# Patient Record
Sex: FEMALE | Race: WHITE | NOT HISPANIC OR LATINO | Employment: OTHER | URBAN - METROPOLITAN AREA
[De-identification: names, ages, dates, MRNs, and addresses within clinical notes are randomized per-mention and may not be internally consistent; named-entity substitution may affect disease eponyms.]

---

## 2017-06-01 ENCOUNTER — DOCUMENTATION ONLY (OUTPATIENT)
Dept: LAB | Facility: CLINIC | Age: 82
End: 2017-06-01

## 2017-06-01 DIAGNOSIS — M81.0 OSTEOPOROSIS: ICD-10-CM

## 2017-06-01 DIAGNOSIS — N18.30 CKD (CHRONIC KIDNEY DISEASE) STAGE 3, GFR 30-59 ML/MIN (H): Primary | ICD-10-CM

## 2017-06-01 DIAGNOSIS — I10 HYPERTENSION GOAL BP (BLOOD PRESSURE) < 140/90: ICD-10-CM

## 2017-07-28 DIAGNOSIS — I10 HYPERTENSION GOAL BP (BLOOD PRESSURE) < 140/90: ICD-10-CM

## 2017-07-28 DIAGNOSIS — M81.0 OSTEOPOROSIS: ICD-10-CM

## 2017-07-28 DIAGNOSIS — N18.30 CKD (CHRONIC KIDNEY DISEASE) STAGE 3, GFR 30-59 ML/MIN (H): ICD-10-CM

## 2017-07-28 LAB
ANION GAP SERPL CALCULATED.3IONS-SCNC: 8 MMOL/L (ref 3–14)
BUN SERPL-MCNC: 18 MG/DL (ref 7–30)
CALCIUM SERPL-MCNC: 9.5 MG/DL (ref 8.5–10.1)
CHLORIDE SERPL-SCNC: 106 MMOL/L (ref 94–109)
CO2 SERPL-SCNC: 30 MMOL/L (ref 20–32)
CREAT SERPL-MCNC: 1.04 MG/DL (ref 0.52–1.04)
DEPRECATED CALCIDIOL+CALCIFEROL SERPL-MC: 61 UG/L (ref 20–75)
GFR SERPL CREATININE-BSD FRML MDRD: 51 ML/MIN/1.7M2
GLUCOSE SERPL-MCNC: 86 MG/DL (ref 70–99)
HGB BLD-MCNC: 13.8 G/DL (ref 11.7–15.7)
POTASSIUM SERPL-SCNC: 4.4 MMOL/L (ref 3.4–5.3)
SODIUM SERPL-SCNC: 144 MMOL/L (ref 133–144)

## 2017-07-28 PROCEDURE — 85018 HEMOGLOBIN: CPT | Performed by: INTERNAL MEDICINE

## 2017-07-28 PROCEDURE — 82306 VITAMIN D 25 HYDROXY: CPT | Performed by: INTERNAL MEDICINE

## 2017-07-28 PROCEDURE — 80048 BASIC METABOLIC PNL TOTAL CA: CPT | Performed by: INTERNAL MEDICINE

## 2017-07-28 PROCEDURE — 36415 COLL VENOUS BLD VENIPUNCTURE: CPT | Performed by: INTERNAL MEDICINE

## 2017-08-03 DIAGNOSIS — I10 ESSENTIAL HYPERTENSION WITH GOAL BLOOD PRESSURE LESS THAN 140/90: ICD-10-CM

## 2017-08-03 RX ORDER — AMLODIPINE BESYLATE 5 MG/1
TABLET ORAL
Qty: 30 TABLET | Refills: 0 | Status: SHIPPED | OUTPATIENT
Start: 2017-08-03 | End: 2017-08-04

## 2017-08-03 NOTE — TELEPHONE ENCOUNTER
Prescription approved per [facility] Refill Protocol.  Patient due for office visit for further refills.  Tawnya Pantoja RN - BC

## 2017-08-03 NOTE — TELEPHONE ENCOUNTER
amLODIPine (NORVASC) 5 MG tablet      Last Written Prescription Date: 7/29/2016  Last Fill Quantity: 90, # refills: 4    Last Office Visit with [facility], [facility] or [facility] prescribing provider:  7/29/2016   Future Office Visit:    Next 5 appointments (look out 90 days)     Aug 04, 2017  9:00 AM CDT   PHYSICAL with Comfort Osborne MD   [facility] ([facility]    [address] [facility] [facility] [address]   [phone]                    BP Readings from Last 3 Encounters:   07/29/16 122/74   11/13/15 138/88   06/05/15 148/80

## 2017-08-04 ENCOUNTER — OFFICE VISIT (OUTPATIENT)
Dept: INTERNAL MEDICINE | Facility: CLINIC | Age: 82
End: 2017-08-04
Payer: COMMERCIAL

## 2017-08-04 VITALS
TEMPERATURE: 98.1 F | OXYGEN SATURATION: 95 % | BODY MASS INDEX: 28.31 KG/M2 | DIASTOLIC BLOOD PRESSURE: 80 MMHG | HEIGHT: 60 IN | SYSTOLIC BLOOD PRESSURE: 150 MMHG | WEIGHT: 144.2 LBS | HEART RATE: 80 BPM

## 2017-08-04 DIAGNOSIS — N18.30 CKD (CHRONIC KIDNEY DISEASE) STAGE 3, GFR 30-59 ML/MIN (H): ICD-10-CM

## 2017-08-04 DIAGNOSIS — Z00.00 ROUTINE GENERAL MEDICAL EXAMINATION AT A HEALTH CARE FACILITY: Primary | ICD-10-CM

## 2017-08-04 DIAGNOSIS — I10 HYPERTENSION GOAL BP (BLOOD PRESSURE) < 140/90: ICD-10-CM

## 2017-08-04 DIAGNOSIS — M81.0 AGE-RELATED OSTEOPOROSIS WITHOUT CURRENT PATHOLOGICAL FRACTURE: ICD-10-CM

## 2017-08-04 PROCEDURE — 99397 PER PM REEVAL EST PAT 65+ YR: CPT | Performed by: INTERNAL MEDICINE

## 2017-08-04 RX ORDER — AMLODIPINE BESYLATE 5 MG/1
TABLET ORAL
Qty: 90 TABLET | Refills: 3 | Status: SHIPPED | OUTPATIENT
Start: 2017-08-04 | End: 2018-07-24

## 2017-08-04 ASSESSMENT — PAIN SCALES - GENERAL: PAINLEVEL: NO PAIN (0)

## 2017-08-04 NOTE — NURSING NOTE
Chief Complaint   Patient presents with     Physical     Discuss family history of Afib, discuss leg cramping at night.       Initial /84 (BP Location: Right arm, Cuff Size: Adult Regular)  Pulse 80  Temp 98.1  F (36.7  C) (Oral)  Ht 5' (1.524 m)  Wt 144 lb 3.2 oz (65.4 kg)  SpO2 95%  Breastfeeding? No  BMI 28.16 kg/m2 Estimated body mass index is 28.16 kg/(m^2) as calculated from the following:    Height as of this encounter: 5' (1.524 m).    Weight as of this encounter: 144 lb 3.2 oz (65.4 kg).  Medication Reconciliation: complete       Jennifer Dye, CMA

## 2017-08-04 NOTE — PROGRESS NOTES
INTERNAL MEDICINE   SUBJECTIVE:   Priscilla Shaw is a 82 year old female who presents for Preventive Visit.      Are you in the first 12 months of your Medicare coverage?  No    Physical   Annual:     Getting at least 3 servings of Calcium per day::  Yes    Bi-annual eye exam::  Yes    Dental care twice a year::  Yes    Sleep apnea or symptoms of sleep apnea::  None    Diet::  Regular (no restrictions)    Taking medications regularly::  Yes    Medication side effects::  None    Additional concerns today::  YES      COGNITIVE SCREEN  1) Repeat 3 items (Banana, Sunrise, Chair)  2) Clock draw: NORMAL  3) 3 item recall: Recalls 3 objects  Results: 3 items recalled: COGNITIVE IMPAIRMENT LESS LIKELY    Mini-CogTM Copyright S David. Licensed by the author for use in [facility]; reprinted with permission (jasbir@[facility]). All rights reserved.          Reviewed and updated as needed this visit by clinical staffTobacco  Allergies  Meds  Med Hx  Surg Hx  Fam Hx  Soc Hx        Reviewed and updated as needed this visit by Provider        Social History   Substance Use Topics     Smoking status: Former Smoker     Types: Cigarettes     Quit date: 9/16/2009     Smokeless tobacco: Never Used     Alcohol use Yes            Standardized Alcohol Screening Questionnaire  AUDIT   Questions 0 1 2 3 4 Score   1. How often do you have a drink  containing alcohol? Never Monthly or less 2 to 4  times a  month 2 to 3  times a  week 4 or more  times a  week  4   2. How many drinks containing alcohol  do you have on a typical day when you are drinking? 1 or 2 3 or 4 5 or 6 7 to 9 10 or more  0   3. How often do you have more than five  or more drinks on one occasion? Never Less  than  monthly Monthly Weekly Daily or  almost  daily  0   4. How often during the last year have  you found that you were not able to stop drinking once you had started? Never Less  than  monthly Monthly Weekly Daily or  almost  daily  0   5. How often  during the last year have  you failed to do what was normally expected of you because of drinking? Never Less  than  monthly Monthly Weekly Daily or  almost  daily  0   6. How often during the last year have  you needed a first drink in the morning to get yourself going after a heavy drinking session? Never Less  than  monthly Monthly Weekly Daily or  almost  daily  0   7. How often during the last year have you had a feeling of guilt or remorse after drinking? Never Less  than  monthly Monthly Weekly Daily or  almost  daily  0   8. How often during the last year have  you been unable to remember what happened the night before because of your drinking? Never Less  than  monthly Monthly Weekly Daily or  almost  daily  0   9. Have you or someone else been  injured because of your drinking? No  Yes, but not in the last year  Yes,  during the  last year  0   10. Has a relative, friend, doctor or other health care worker been concerned about your drinking or suggested you cut down? No  Yes, but not in the last year  Yes,  during the  last year  0   Total  4   Scoring: A score of 7 for adult men is an indication of hazardous drinking (risk for physical or physiological harm); a score of 8 or more is an indication of an alcohol use disorder. A score of 5 or more for adult women  is an indication of hazardous drinking or an alcohol use disorder.               Chief Complaint   Patient presents with     Physical     Discuss family history of Afib, discuss leg cramping at night.         Today's PHQ-2 Score: PHQ-2 ( 1999 Pf[employer]) 8/4/2017   Q1: Little interest or pleasure in doing things 0   Q2: Feeling down, depressed or hopeless 0   PHQ-2 Score 0   Q1: Little interest or pleasure in doing things Not at all   Q2: Feeling down, depressed or hopeless Not at all   PHQ-2 Score 0       Do you feel safe in your environment - Yes    Do you have a Health Care Directive?: Yes: Patient states has Advance Directive and will bring in a  copy to clinic.    Current providers sharing in care for this patient include:   Patient Care Team:  Comfort Osborne MD as PCP - General      Hearing impairment: No    Ability to successfully perform activities of daily living: Yes, no assistance needed     Fall risk:         Home safety:  none identified      The following health maintenance items are reviewed in Epic and correct as of today:Health Maintenance   Topic Date Due     EYE EXAM Q1 YEAR  07/29/2017     FALL RISK ASSESSMENT  07/29/2017     INFLUENZA VACCINE (SYSTEM ASSIGNED)  09/01/2017     DEXA Q2 YR  09/18/2017     BMP Q1 YR  07/28/2018     MAMMO Q2 YR  09/09/2018     LIPID MONITORING Q5 YEARS  06/05/2020     ADVANCE DIRECTIVE PLANNING Q5 YRS  11/11/2021     TETANUS IMMUNIZATION (SYSTEM ASSIGNED)  05/24/2023     COLONOSCOPY Q10 YR  09/23/2024     PNEUMOCOCCAL  Completed     Labs reviewed in EPIC      A Fib - Her older and younger siblings all have a.fib which has her concerned for her risk of getting it as well. Two of her older siblings have pacemakers, her younger brother is on Warfarin, and her younger sister recently had an ablation. She has not been taking her blood pressure at home.     Lab Review - Mild kidney function impairment that has been stable for three years. Normal electrolytes and cholesterol. Glucose was 86. Vitamin D level normal. Normal hemoglobin.     Leg Cramping - Only happens at night about once weekly. This always happens in the middle of the night. She relates that it was recommended that she try an over the counter medication, but does not recall what it was. Her cramps are localized in her thighs. She used to get cramping in her knee when she was walking around in [place].     Alcohol Use - She will have two small glasses of wine on the occasions that she does drink.     ROS:  C: NEGATIVE for fever, chills, change in weight  E/M: POSITIVE for dental implants. NEGATIVE for ear, mouth and throat problems  B: NEGATIVE for  masses, tenderness or discharge  CV: NEGATIVE for chest pain, palpitations or peripheral edema. NO carotid bruits.   M: POSITIVE for nocturnal leg cramping. NEGATIVE for significant arthralgias or myalgia  H: NEGATIVE for bleeding problems  All other systems reviewed and were negative.      This document serves as a record of the services and decisions personally performed and made by Comfort Osborne MD. It was created on his/her behalf by Libra Sams, a trained medical scribe. The creation of this document is based the provider's statements to the medical scribe.    Scribsherman Sams 9:13 AM, August 4, 2017    OBJECTIVE:   /84 (BP Location: Right arm, Cuff Size: Adult Regular)  Pulse 80  Temp 98.1  F (36.7  C) (Oral)  Ht 5' (1.524 m)  Wt 144 lb 3.2 oz (65.4 kg)  SpO2 95%  Breastfeeding? No  BMI 28.16 kg/m2 Estimated body mass index is 28.16 kg/(m^2) as calculated from the following:    Height as of this encounter: 5' (1.524 m).    Weight as of this encounter: 144 lb 3.2 oz (65.4 kg).  EXAM:   GENERAL APPEARANCE: healthy, alert and no distress  EYES: Eyes grossly normal to inspection, PERRL and conjunctivae and sclerae normal  HENT: ear canals and TM's normal, nose and mouth without ulcers or lesions, oropharynx clear and oral mucous membranes moist  NECK: no adenopathy, no asymmetry, masses, or scars and thyroid normal to palpation  RESP: lungs clear to auscultation - no rales, rhonchi or wheezes  BREAST: normal without masses, tenderness or nipple discharge and no palpable axillary masses or adenopathy  CV: regular rate and rhythm, normal S1 S2, no S3 or S4, no murmur, click or rub, no peripheral edema.   ABDOMEN: soft, nontender, no hepatosplenomegaly, no masses and bowel sounds normal  MS: no musculoskeletal defects are noted and gait is age appropriate without ataxia. 1+ posterior tibial pulses.   SKIN: no suspicious lesions or rashes. Cherry hemangiomas and seborrheic keratoses on the  back.   NEURO: Normal strength and tone, sensory exam grossly normal, mentation intact and speech normal  PSYCH: mentation appears normal and affect normal/bright    ASSESSMENT / PLAN:   I spent 20 minutes of time with the patient and >50% of it was in education and counseling regarding preventive healthcare.     1. Essential hypertension with goal blood pressure less than 140/90   a little high - pharmacy blood pressure check in 2 weeks.  - amLODIPine (NORVASC) 5 MG tablet;   Dispense: 30 tablet; Refill: 0    2. Routine general medical examination at a health care facility    ckd- The current medical regimen is effective;  continue present plan and medications.     Osteoporosis - DEXa due     End of Life Planning:  Patient currently has an advanced directive: Yes.  Practitioner is supportive of decision.      COUNSELING:  Reviewed preventive health counseling, as reflected in patient instructions       Regular exercise  Estimated body mass index is 28.16 kg/(m^2) as calculated from the following:    Height as of this encounter: 5' (1.524 m).    Weight as of this encounter: 144 lb 3.2 oz (65.4 kg).   reports that she quit smoking about 7 years ago. Her smoking use included Cigarettes. She has never used smokeless tobacco.      Appropriate preventive services were discussed with this patient, including applicable screening as appropriate for cardiovascular disease, diabetes, osteopenia/osteoporosis, and glaucoma.  As appropriate for age/gender, discussed screening for colorectal cancer, prostate cancer, breast cancer, and cervical cancer. Checklist reviewing preventive services available has been given to the patient.    Reviewed patients plan of care and provided an AVS. The Basic Care Plan (routine screening as documented in Health Maintenance) for Priscilla meets the Care Plan requirement. This Care Plan has been established and reviewed with the Patient.      Patient Instructions   - Do not take Ibuprofen, Advil, or  Aleve for pain relief because of kidney function. You can take Tylenol instead as needed.   - Try to stretch your legs before going to bed to avoid cramping.   - You can take a calcium-magnesium tablet at bedtime to help with the cramping too.   - Stick to one glass of wine when you drink.   - Continue to walk as much as you can, this will help with the cramping in your legs at night.   - Have your bone density exam this Fall.  - Follow up in 1 year or sooner as needed.        Counseling Resources:  ATP IV Guidelines  Pooled Cohorts Equation Calculator  Breast Cancer Risk Calculator  FRAX Risk Assessment  ICSI Preventive Guidelines  Dietary Guidelines for Americans, 2010  USDA's MyPlate  ASA Prophylaxis  Lung CA Screening  The information in this document, created by the medical scribe for me, accurately reflects the services I personally performed and the decisions made by me. I have reviewed and approved this document for accuracy prior to leaving the patient care area.  Comfort Osborne MD  9:12 AM, 08/04/17    Comfort Osborne MD  [facility] for HPI/ROS submitted by the patient on 8/4/2017   PHQ-2 Score: 0    Start: 9:12 AM   End: 9:32 AM

## 2017-08-04 NOTE — MR AVS SNAPSHOT
After Visit Summary   8/4/2017    Priscilla Shaw    MRN: [id number]           Patient Information     Date Of Birth          1935        Visit Information        Provider Department      8/4/2017 9:00 AM Comfort Osborne MD [facility]        Today's Diagnoses     Routine general medical examination at a health care facility    -  1    Essential hypertension with goal blood pressure less than 140/90        Age-related osteoporosis without current pathological fracture        Hypertension goal BP (blood pressure) < 140/90          Care Instructions    - Do not take Ibuprofen, Advil, or Aleve for pain relief because of kidney function. You can take Tylenol instead as needed.   - Try to stretch your legs before going to bed to avoid cramping.   - You can take a calcium-magnesium tablet at bedtime to help with the cramping too.   - Stick to one glass of wine when you drink.   - Continue to walk as much as you can, this will help with the cramping in your legs at night.   - Have your bone density exam this Fall.  - Follow up in 1 year or sooner as needed.         [facility]    If you have any questions regarding to your visit please contact your care team:     Team Pink:   Clinic Hours Telephone Number   Internal Medicine:  Dr. Comfort Post NP       7am-7pm  Monday - Thursday   7am-5pm  Fridays  [phone]  (Appointment scheduling available 24/7)    Questions about your visit?  Team Line  [phone]   Urgent Care - [place] - 11am-9pm Monday-Friday Saturday-Sunday- 9am-5pm   [place] - 5pm-9pm Monday-Friday Saturday-Sunday- 9am-5pm  [phone] - [place]   [phone] - [place]       What options do I have for visits at the clinic other than the traditional office visit?  To expand how we care for you, many of our providers are utilizing electronic visits (e-visits) and telephone visits, when  medically appropriate, for interactions with their patients rather than a visit in the clinic.   We also offer nurse visits for many medical concerns. Just like any other service, we will bill your insurance company for this type of visit based on time spent on the phone with your provider. Not all insurance companies cover these visits. Please check with your medical insurance if this type of visit is covered. You will be responsible for any charges that are not paid by your insurance.      E-visits via Decisyonhart:  generally incur a $35.00 fee.  Telephone visits:  Time spent on the phone: *charged based on time that is spent on the phone in increments of 10 minutes. Estimated cost:   5-10 mins $30.00   11-20 mins. $59.00   21-30 mins. $85.00   Use [employer] [place] (secure email communication and access to your chart) to send your primary care provider a message or make an appointment. Ask someone on your Team how to sign up for [employer] [place].    For a Price Quote for your services, please call our [employer] Line at [phone].    As always, Thank you for trusting us with your health care needs!    Discharged by Wilma PICKARD CMA ([facility])            Follow-ups after your visit        Follow-up notes from your care team     Return in about 1 year (around 8/4/2018).      Future tests that were ordered for you today     Open Future Orders        Priority Expected Expires Ordered    DX Hip/Pelvis/Spine Routine  8/4/2018 8/4/2017            Who to contact     If you have questions or need follow up information about today's clinic visit or your schedule please contact [facility] directly at [phone].  Normal or non-critical lab and imaging results will be communicated to you by MyChart, letter or phone within 4 business days after the clinic has received the results. If you do not hear from us within 7 days, please contact the clinic through MyChart or phone. If you have a critical or abnormal lab result, we will  "notify you by phone as soon as possible.  Submit refill requests through [employer] or call your pharmacy and they will forward the refill request to us. Please allow 3 business days for your refill to be completed.          Additional Information About Your Visit        miloghart Information     [employer] lets you send messages to your doctor, view your test results, renew your prescriptions, schedule appointments and more. To sign up, go to [email or web address] . Click on \"Log in\" on the left side of the screen, which will take you to the Welcome page. Then click on \"Sign up Now\" on the right side of the page.     You will be asked to enter the access code listed below, as well as some personal information. Please follow the directions to create your username and password.     Your access code is: [email or web address]  Expires: 2017  9:32 AM     Your access code will  in 90 days. If you need help or a new code, please call your [place] clinic or [phone].        Care EveryWhere ID     This is your Care EveryWhere ID. This could be used by other organizations to access your [place] medical records  [id number]        Your Vitals Were     Pulse Temperature Height Pulse Oximetry Breastfeeding? BMI (Body Mass Index)    80 98.1  F (36.7  C) (Oral) 5' (1.524 m) 95% No 28.16 kg/m2       Blood Pressure from Last 3 Encounters:   17 144/84   16 122/74   11/13/15 138/88    Weight from Last 3 Encounters:   17 144 lb 3.2 oz (65.4 kg)   16 140 lb (63.5 kg)   11/13/15 143 lb (64.9 kg)                 Today's Medication Changes          These changes are accurate as of: 17  9:32 AM.  If you have any questions, ask your nurse or doctor.               These medicines have changed or have updated prescriptions.        Dose/Directions    amLODIPine 5 MG tablet   Commonly known as:  NORVASC   This may have changed:  See the new instructions.   Used for:  Essential hypertension with goal blood pressure " less than 140/90   Changed by:  Comfort Osborne MD        TAKE 1 TABLET (5 MG) BY MOUTH DAILY   Quantity:  90 tablet   Refills:  3            Where to get your medicines      These medications were sent to [place] [id number] [facility] - [place] - [address]  [address] [place] [address]     Phone:  [phone]     amLODIPine 5 MG tablet                Primary Care Provider Office Phone # Fax #    Comfort Osborne [phone]       [address] [address]        Equal Access to Services     [facility]: Hadii aad ku hadasho Soomaali, waaxda luqadaha, qaybta kaalmada adeegyada, waxay idiin hayaan ademandie estradaaraerasto calle . So [facility] [phone].    ATENCIÓN: Si habla español, tiene a garcía disposición servicios gratuitos de asistencia lingüística. [facility] [phone].    We comply with applicable federal civil rights laws and [place] laws. We do not discriminate on the basis of race, color, national origin, age, disability sex, sexual orientation or gender identity.            Thank you!     Thank you for choosing [facility]  for your care. Our goal is always to provide you with excellent care. Hearing back from our patients is one way we can continue to improve our services. Please take a few minutes to complete the written survey that you may receive in the mail after your visit with us. Thank you!             Your Updated Medication List - Protect others around you: Learn how to safely use, store and throw away your medicines at www.disposemymeds.org.          This list is accurate as of: 8/4/17  9:32 AM.  Always use your most recent med list.                   Brand Name Dispense Instructions for use Diagnosis    amLODIPine 5 MG tablet    NORVASC    90 tablet    TAKE 1 TABLET (5 MG) BY MOUTH DAILY    Essential hypertension with goal blood pressure less than 140/90       aspirin 81 MG tablet      Take 1 tablet by  mouth daily.        CENTRUM SILVER per tablet      Take 1 tablet by mouth daily. 1 tablet        cod liver oil Caps capsule      1 daily        SUPER B COMPLEX Tabs      1 daily        VIACTIV 500-500-40 MG-UNT-MCG Chew   Generic drug:  calcium-vitamin D-vitamin K      1 daily        vitamin D 400 UNITS tablet      1daily

## 2017-08-04 NOTE — PATIENT INSTRUCTIONS
- Do not take Ibuprofen, Advil, or Aleve for pain relief because of kidney function. You can take Tylenol instead as needed.   - Try to stretch your legs before going to bed to avoid cramping.   - You can take a calcium-magnesium tablet at bedtime to help with the cramping too.   - Stick to one glass of wine when you drink.   - Continue to walk as much as you can, this will help with the cramping in your legs at night.   - Have your bone density exam this Fall.  - Follow up in 1 year or sooner as needed.         [facility]    If you have any questions regarding to your visit please contact your care team:     Team Pink:   Clinic Hours Telephone Number   Internal Medicine:  Dr. Comfort Post NP       7am-7pm  Monday - Thursday   7am-5pm  Fridays  [phone]  (Appointment scheduling available 24/7)    Questions about your visit?  Team Line  [phone]   Urgent Care - [place] - 11am-9pm Monday-Friday Saturday-Sunday- 9am-5pm   [place] - 5pm-9pm Monday-Friday Saturday-Sunday- 9am-5pm  [phone] - [place]   [phone] - [place]       What options do I have for visits at the clinic other than the traditional office visit?  To expand how we care for you, many of our providers are utilizing electronic visits (e-visits) and telephone visits, when medically appropriate, for interactions with their patients rather than a visit in the clinic.   We also offer nurse visits for many medical concerns. Just like any other service, we will bill your insurance company for this type of visit based on time spent on the phone with your provider. Not all insurance companies cover these visits. Please check with your medical insurance if this type of visit is covered. You will be responsible for any charges that are not paid by your insurance.      E-visits via [employer] Software:  generally incur a $35.00 fee.  Telephone visits:  Time spent on the  phone: *charged based on time that is spent on the phone in increments of 10 minutes. Estimated cost:   5-10 mins $30.00   11-20 mins. $59.00   21-30 mins. $85.00   Use Yoozonhart (secure email communication and access to your chart) to send your primary care provider a message or make an appointment. Ask someone on your Team how to sign up for [employer].    For a Price Quote for your services, please call our [employer] Price Line at [phone].    As always, Thank you for trusting us with your health care needs!    Discharged by Wilma PICKARD CMA ([facility])

## 2017-08-18 ENCOUNTER — ALLIED HEALTH/NURSE VISIT (OUTPATIENT)
Dept: FAMILY MEDICINE | Facility: CLINIC | Age: 82
End: 2017-08-18

## 2017-08-18 VITALS — SYSTOLIC BLOOD PRESSURE: 138 MMHG | DIASTOLIC BLOOD PRESSURE: 82 MMHG

## 2017-08-18 DIAGNOSIS — I10 HYPERTENSION GOAL BP (BLOOD PRESSURE) < 140/90: Primary | ICD-10-CM

## 2017-08-18 NOTE — PROGRESS NOTES
Priscilla Shaw is enrolled/participating in the retail pharmacy Blood Pressure Goals Achievement Program (BPGAP).  Priscilla Shaw was evaluated at [facility] on August 18, 2017 at which time her blood pressure was:    BP Readings from Last 3 Encounters:   08/18/17 138/82   08/04/17 150/80   07/29/16 122/74     Reviewed lifestyle modifications for blood pressure control and reduction: including making healthy food choices, managing weight, getting regular exercise, smoking cessation, reducing alcohol consumption, monitoring blood pressure regularly.     Priscilla Shaw is not experiencing symptoms.    Follow-Up: BP is at goal of < 140/90mmHg (patient 18+ years of age with or without diabetes).  Recommended follow-up at pharmacy in 6 months.     Recommendation to Provider: none at this time. Patient has not had any symptoms of high blood pressure or any side effects from tht medication that she reported at this time.     Priscilla Shaw was evaluated for enrollment into the PGEN study today.    Patient eligible for enrollment:  No  Patient interested in enrollment:  No    Completed by: Thank you,  Zuleyka Henderson, PharmD  [facility]  [phone]

## 2017-08-18 NOTE — MR AVS SNAPSHOT
"              After Visit Summary   2017    Priscilla Shaw    MRN: [id number]           Patient Information     Date Of Birth          1935        Visit Information        Provider Department      2017 1:49 PM Comfort Osborne MD [facility]        Today's Diagnoses     Hypertension goal BP (blood pressure) < 140/90    -  1       Follow-ups after your visit        Who to contact     If you have questions or need follow up information about today's clinic visit or your schedule please contact [facility] directly at [phone].  Normal or non-critical lab and imaging results will be communicated to you by Closehart, letter or phone within 4 business days after the clinic has received the results. If you do not hear from us within 7 days, please contact the clinic through Closehart or phone. If you have a critical or abnormal lab result, we will notify you by phone as soon as possible.  Submit refill requests through Alpha[employer] or call your pharmacy and they will forward the refill request to us. Please allow 3 business days for your refill to be completed.          Additional Information About Your Visit        MyChart Information     AlphaLab lets you send messages to your doctor, view your test results, renew your prescriptions, schedule appointments and more. To sign up, go to [email or web address] . Click on \"Log in\" on the left side of the screen, which will take you to the Welcome page. Then click on \"Sign up Now\" on the right side of the page.     You will be asked to enter the access code listed below, as well as some personal information. Please follow the directions to create your username and password.     Your access code is: [email or web address]  Expires: 2017  9:32 AM     Your access code will  in 90 days. If you need help or a new code, please call your [facility] or [phone].        Care EveryWhere ID     This is your Care EveryWhere ID. This could be " used by other organizations to access your [place] medical records  [id number]         Blood Pressure from Last 3 Encounters:   08/18/17 138/82   08/04/17 150/80   07/29/16 122/74    Weight from Last 3 Encounters:   08/04/17 144 lb 3.2 oz (65.4 kg)   07/29/16 140 lb (63.5 kg)   11/13/15 143 lb (64.9 kg)              Today, you had the following     No orders found for display       Primary Care Provider Office Phone # Fax #    Comfort Osborne [phone] [address] [facility] [address]        Equal Access to Services     ADDISON PEREYRA : Hadii keri novako Somaribel, waclarada patel, qaniltonta kaalmada ademandieyada, raj johnson. So [facility] [phone].    ATENCIÓN: Si habla español, tiene a garcía disposición servicios gratuitos de asistencia lingüística. Llame al [phone].    We comply with applicable federal civil rights laws and [place] laws. We do not discriminate on the basis of race, color, national origin, age, disability sex, sexual orientation or gender identity.            Thank you!     Thank you for choosing [facility]  for your care. Our goal is always to provide you with excellent care. Hearing back from our patients is one way we can continue to improve our services. Please take a few minutes to complete the written survey that you may receive in the mail after your visit with us. Thank you!             Your Updated Medication List - Protect others around you: Learn how to safely use, store and throw away your medicines at www.disposemymeds.org.          This list is accurate as of: 8/18/17 11:59 PM.  Always use your most recent med list.                   Brand Name Dispense Instructions for use Diagnosis    amLODIPine 5 MG tablet    NORVASC    90 tablet    TAKE 1 TABLET (5 MG) BY MOUTH DAILY        aspirin 81 MG tablet      Take 1 tablet by mouth daily.        CENTRUM SILVER per tablet      Take 1 tablet by mouth daily. 1  tablet        cod liver oil Caps capsule      1 daily        SUPER B COMPLEX Tabs      1 daily        VIACTIV 500-500-40 MG-UNT-MCG Chew   Generic drug:  calcium-vitamin D-vitamin K      1 daily        vitamin D 400 UNITS tablet      1daily

## 2017-09-26 ENCOUNTER — TELEPHONE (OUTPATIENT)
Dept: FAMILY MEDICINE | Facility: CLINIC | Age: 82
End: 2017-09-26

## 2017-09-26 NOTE — TELEPHONE ENCOUNTER
Called patient and informed of message below and no further questions or concerns.     Wilma PICKARD CMA ([facility])

## 2017-09-26 NOTE — TELEPHONE ENCOUNTER
Reason for call: question  Patient called regarding (reason for call): Patient is wondering if she should have a mammogram this year?  Additional comments: Please call patient to discuss further      Phone number to reach patient:  Home number on file [phone] (home)    Best Time:  anytime    Can we leave a detailed message on this number?  YES

## 2017-09-28 ENCOUNTER — ALLIED HEALTH/NURSE VISIT (OUTPATIENT)
Dept: NURSING | Facility: CLINIC | Age: 82
End: 2017-09-28
Payer: COMMERCIAL

## 2017-09-28 DIAGNOSIS — Z23 NEED FOR PROPHYLACTIC VACCINATION AND INOCULATION AGAINST INFLUENZA: Primary | ICD-10-CM

## 2017-09-28 PROCEDURE — 90662 IIV NO PRSV INCREASED AG IM: CPT

## 2017-09-28 PROCEDURE — G0008 ADMIN INFLUENZA VIRUS VAC: HCPCS

## 2017-09-28 PROCEDURE — 99207 ZZC NO CHARGE NURSE ONLY: CPT

## 2017-09-28 NOTE — MR AVS SNAPSHOT
"              After Visit Summary   9/28/2017    Priscilla Shaw    MRN: [id number]           Patient Information     Date Of Birth          1935        Visit Information        Provider Department      9/28/2017 10:45 AM FZ ANCILLARY [facility]        Today's Diagnoses     Need for prophylactic vaccination and inoculation against influenza    -  1       Follow-ups after your visit        Your next 10 appointments already scheduled     Oct 06, 2017  1:00 PM CDT   DX HIP/PELVIS/SPINE with FKDX1   [facility]    [address] [place] [id number] [phone]           Please do not take any of the following 24 hours prior to the day of your exam: vitamins, calcium tablets, antacids.  If possible, please wear clothes without metal (snaps, zippers). A sweatsuit works well.              Who to contact     If you have questions or need follow up information about today's clinic visit or your schedule please contact [facility] directly at [phone].  Normal or non-critical lab and imaging results will be communicated to you by Shmoophart, letter or phone within 4 business days after the clinic has received the results. If you do not hear from us within 7 days, please contact the clinic through Shmoophart or phone. If you have a critical or abnormal lab result, we will notify you by phone as soon as possible.  Submit refill requests through [email or web address] or call your pharmacy and they will forward the refill request to us. Please allow 3 business days for your refill to be completed.          Additional Information About Your Visit        ShmoopharLokalite Information     [email or web address] lets you send messages to your doctor, view your test results, renew your prescriptions, schedule appointments and more. To sign up, go to [email or web address] . Click on \"Log in\" on the left side of the screen, which will take you to the Welcome page. Then click on \"Sign up " "Now\" on the right side of the page.     You will be asked to enter the access code listed below, as well as some personal information. Please follow the directions to create your username and password.     Your access code is: [email or web address]  Expires: 2017  9:32 AM     Your access code will  in 90 days. If you need help or a new code, please call your [place] clinic or [phone].        Care EveryWhere ID     This is your Care EveryWhere ID. This could be used by other organizations to access your [place] medical records  [id number]         Blood Pressure from Last 3 Encounters:   17 138/82   17 150/80   16 122/74    Weight from Last 3 Encounters:   17 144 lb 3.2 oz (65.4 kg)   16 140 lb (63.5 kg)   11/13/15 143 lb (64.9 kg)              We Performed the Following     FLU VACCINE, INCREASED ANTIGEN, PRESV FREE, AGE 65+ [77832]     Vaccine Administration, Initial [59562]        Primary Care Provider Office Phone # Fax #    Comfort Osborne [phone] [address] [facility] [address]        Equal Access to Services     ADDISON PEREYRA : Hadii keri ku hadasho Soomaali, waaxda luqadaha, qaybta kaalmada adeegyada, raj calle . So [facility] [phone].    ATENCIÓN: Si habla español, tiene a garcía disposición servicios gratuitos de asistencia lingüística. Llame al [phone].    We comply with applicable federal civil rights laws and [place] laws. We do not discriminate on the basis of race, color, national origin, age, disability sex, sexual orientation or gender identity.            Thank you!     Thank you for choosing [facility]  for your care. Our goal is always to provide you with excellent care. Hearing back from our patients is one way we can continue to improve our services. Please take a few minutes to complete the written survey that you may receive in the mail after your visit with us. Thank you!   "           Your Updated Medication List - Protect others around you: Learn how to safely use, store and throw away your medicines at www.disposemymeds.org.          This list is accurate as of: 9/28/17 10:47 AM.  Always use your most recent med list.                   Brand Name Dispense Instructions for use Diagnosis    amLODIPine 5 MG tablet    NORVASC    90 tablet    TAKE 1 TABLET (5 MG) BY MOUTH DAILY        aspirin 81 MG tablet      Take 1 tablet by mouth daily.        CENTRUM SILVER per tablet      Take 1 tablet by mouth daily. 1 tablet        cod liver oil Caps capsule      1 daily        SUPER B COMPLEX Tabs      1 daily        VIACTIV 500-500-40 MG-UNT-MCG Chew   Generic drug:  calcium-vitamin D-vitamin K      1 daily        vitamin D 400 UNITS tablet      1daily

## 2017-09-28 NOTE — NURSING NOTE
Prior to injection verified patient identity using patient's name and date of birth.  Iris LONGO CMA ([facility])

## 2017-10-19 ENCOUNTER — TRANSFERRED RECORDS (OUTPATIENT)
Dept: HEALTH INFORMATION MANAGEMENT | Facility: CLINIC | Age: 82
End: 2017-10-19

## 2018-01-26 ENCOUNTER — RADIANT APPOINTMENT (OUTPATIENT)
Dept: BONE DENSITY | Facility: CLINIC | Age: 83
End: 2018-01-26
Attending: INTERNAL MEDICINE
Payer: COMMERCIAL

## 2018-01-26 DIAGNOSIS — M81.0 AGE-RELATED OSTEOPOROSIS WITHOUT CURRENT PATHOLOGICAL FRACTURE: ICD-10-CM

## 2018-01-26 PROCEDURE — 77085 DXA BONE DENSITY AXL VRT FX: CPT | Performed by: INTERNAL MEDICINE

## 2018-01-26 NOTE — LETTER
[address] [place] [address]    2018    Priscilla Shaw  [address] [address]          Dear Priscilla,  Your bone density is worsening.  Let's discuss your options further in the office.   Enclosed is a copy of your results.     Results for orders placed or performed in visit on 18   DX Hip/Pelvis/Spine w Lat Fraction Blank    Narrative    BONE DENSITOMETRY  [address]  [place] [address]  2018      PATIENT: Priscilla Shaw  CHART: [id number]   :  1935  AGE:  82 year old  SEX:  female   REFERRING PROVIDER:  Comfort Osborne MD     PROCEDURE:  Bone density scanning was performed using DXA technology of   the lumbar spine and hip.  Scanning was performed on a Lunar Prodigy   scanner.  Reporting is completed in the form of a T-score.  The T-score   represents the standard deviation from peak bone mass based on a young   healthy adult.     REFERENCE T-SCORES:       Normal                -1.0 and greater                                 Osteopenia         Between -1.0 and -2.5                                             Osteoporosis     -2.5 and less                                       RISK FACTORS:  Post-menopausal, Parent history of osteoporosis, Parent   history of a hip fracture, Fracture of wrist, follow up osteoporosis    CURRENT TREATMENT:  Calcium, Vitamin D     FINDINGS:               Lumbar Spine (L1-L4) T-score:  -1.7, degenerative changes   present               Left Femoral Neck T-score:  -2.1               Right Femoral Neck T-score:  -2.4                              Lumbar (L1-L4) BMD: 0.972            Previous:   1.007                                              Total Hip Mean BMD: 0.746            Previous:   0.769     Comparison is made to another DXA performed on the same Lunar Prodigy    machine on 2015.      LATERAL  "VERTEBRAL ASSESSMENT  Procedure:  Vertebral fracture assessment was performed in the lateral   decubitus position using a Lunar Prodigy  densitometer.  Indications for VFA: T-score of -1.0 or worse and age (female>69)  Confounding factors for VFA: Arthritis/degenerative disc disease.  The LVA   scan is interpretable from T5 to L4.    VFA Findings: Using the semi-quantitative analysis of Darrin there was   evidence of no spinal deformity  VFA Impression: Priscilla Shaw has no vertebral fractures identified on   the VFA.       IMPRESSION  Osteopenia., Degenerative changes of the lumbar spine which may falsely   elevate results.    There has been a trend toward  significant decrease in bone density of the   lumbar spine. There has been no significant change in bone density of the   hip(s).    Recommendations include ensuring adequate Calcium and Vitamin D.    The current NOF Guidelines recommend treatment for patients with prior hip   or vertebral fracture, T-score -2.5 or below, or 10 year risk of any major   osteoporotic fracture >20% or 10 year risk of hip fracture >3%, as   calculated using the FRAX calculator (www.shef.ac.uk/FRAX or you can   google \"FRAX\").      This patient's risks based on available information, with the use of FRAX,   are 45 % for major osteoporotic fracture and 30 % for hip fracture.   Based on these guidelines, treatment (in addition to calcium and vitamin   D) is recommended for this patient, after ruling out other causes of   osteoporosis.  This is meant as an aid to clinical decision making; one must still use   clinical judgement.      Follow up can be considered in 2 years.   ___________________  Zuleyka Soler M.D.  Electronically signed            If you have any questions or concerns, please call myself or my nurse at [phone].      Sincerely,        Comfort Osborne MD/rowdy  "

## 2018-01-26 NOTE — PROGRESS NOTES
Priscilla Rainey.  Your bone density is worsening.  Let's discuss your options further in the office.  Dr. Osborne

## 2018-07-13 ENCOUNTER — ALLIED HEALTH/NURSE VISIT (OUTPATIENT)
Dept: FAMILY MEDICINE | Facility: CLINIC | Age: 83
End: 2018-07-13
Payer: COMMERCIAL

## 2018-07-13 VITALS — SYSTOLIC BLOOD PRESSURE: 118 MMHG | HEART RATE: 72 BPM | DIASTOLIC BLOOD PRESSURE: 64 MMHG

## 2018-07-13 DIAGNOSIS — I10 HYPERTENSION GOAL BP (BLOOD PRESSURE) < 140/90: Primary | ICD-10-CM

## 2018-07-13 PROCEDURE — 99207 ZZC NO CHARGE NURSE ONLY: CPT | Performed by: INTERNAL MEDICINE

## 2018-07-13 NOTE — PROGRESS NOTES
Priscilla Shaw is enrolled/participating in the retail pharmacy Blood Pressure Goals Achievement Program (BPGAP).  Priscilla Shaw was evaluated at [facility] on July 13, 2018 at which time her blood pressure was:    BP Readings from Last 3 Encounters:   07/13/18 118/64   08/18/17 138/82   08/04/17 150/80     Reviewed lifestyle modifications for blood pressure control and reduction: including making healthy food choices, managing weight, getting regular exercise, smoking cessation, reducing alcohol consumption, monitoring blood pressure regularly.     Priscilla Shaw is not experiencing symtoms:     Follow-Up: BP is at goal of < 140/90mmHg (patient 18+ years of age with or without diabetes).  Recommended follow-up at pharmacy in 6 months.     Recommendation to Provider: None at this time.     Priscilla Shaw was evaluated for enrollment into the PGEN study today.    Patient eligible for enrollment:  No  Patient interested in enrollment:  No    Completed by: Thank you,  Zuleyka Henderson, PharmD  [facility]  [phone]

## 2018-07-13 NOTE — MR AVS SNAPSHOT
After Visit Summary   7/13/2018    Priscilla Shaw    MRN: [id number]           Patient Information     Date Of Birth          1935        Visit Information        Provider Department      7/13/2018 10:18 AM Comfort Osborne MD [facility] [place]        Today's Diagnoses     Hypertension goal BP (blood pressure) < 140/90    -  1       Follow-ups after your visit        Your next 10 appointments already scheduled     Aug 03, 2018  8:00 AM CDT   LAB with  LAB   [facility] [place] ([facility])    [address] [facility] [address]   [phone]           Please do not eat 10-12 hours before your appointment if you are coming in fasting for labs on lipids, cholesterol, or glucose (sugar). This does not apply to pregnant women. Water, hot tea and black coffee (with nothing added) are okay. Do not drink other fluids, diet soda or chew gum.            Aug 13, 2018 11:00 AM CDT   PHYSICAL with Comfort Osborne MD   [facility] [place] ([facility])    [address] [facility] [address] [address]   [phone]              Future tests that were ordered for you today     Open Future Orders        Priority Expected Expires Ordered    **Basic metabolic panel FUTURE anytime Routine 7/25/2018 7/25/2019 7/25/2018    Lipid panel reflex to direct LDL Fasting Routine 7/25/2018 7/25/2019 7/25/2018    **TSH with free T4 reflex FUTURE anytime Routine 7/25/2018 7/25/2019 7/25/2018    **Hemoglobin FUTURE anytime Routine 7/25/2018 7/25/2019 7/25/2018    Albumin Random Urine Quantitative with Creat Ratio Routine 7/25/2018 7/25/2019 7/25/2018    **Vitamin D Deficiency FUTURE 2mo Routine 8/2/2018 11/22/2018 7/25/2018            Who to contact     If you have questions or need follow up information about today's clinic visit or your schedule please contact [place] [facility] [place] directly at [phone].  Normal or non-critical lab and imaging results will be  "communicated to you by MyChart, letter or phone within 4 business days after the clinic has received the results. If you do not hear from us within 7 days, please contact the clinic through [employer] or phone. If you have a critical or abnormal lab result, we will notify you by phone as soon as possible.  Submit refill requests through [employer] or call your pharmacy and they will forward the refill request to us. Please allow 3 business days for your refill to be completed.          Additional Information About Your Visit        [employer] Information     [employer] lets you send messages to your doctor, view your test results, renew your prescriptions, schedule appointments and more. To sign up, go to [email or web address] . Click on \"Log in\" on the left side of the screen, which will take you to the Welcome page. Then click on \"Sign up Now\" on the right side of the page.     You will be asked to enter the access code listed below, as well as some personal information. Please follow the directions to create your username and password.     Your access code is: [email or web address]  Expires: 10/23/2018 12:51 PM     Your access code will  in 90 days. If you need help or a new code, please call your [place] clinic or [phone].        Care EveryWhere ID     This is your Care EveryWhere ID. This could be used by other organizations to access your [place] medical records  [id number]        Your Vitals Were     Pulse                   72            Blood Pressure from Last 3 Encounters:   18 118/64   17 138/82   17 150/80    Weight from Last 3 Encounters:   17 144 lb 3.2 oz (65.4 kg)   16 140 lb (63.5 kg)   11/13/15 143 lb (64.9 kg)              Today, you had the following     No orders found for display       Primary Care Provider Office Phone # Fax #    Comfort Osborne [phone] [address] [facility] [place] [address]        Equal Access to Services     BENITA PEREYRA " AH: Marcelino benavidessalvadorbrenton Georgina, waclarada luqadaha, qaybta kamerna messer, raj evaristo deejoe estradamaeerasto johnson. So [facility] [phone].    ATENCIÓN: Si habla español, tiene a garcía disposición servicios gratuitos de asistencia lingüística. Llame al [phone].    We comply with applicable federal civil rights laws and [place] laws. We do not discriminate on the basis of race, color, national origin, age, disability, sex, sexual orientation, or gender identity.            Thank you!     Thank you for choosing [facility]  for your care. Our goal is always to provide you with excellent care. Hearing back from our patients is one way we can continue to improve our services. Please take a few minutes to complete the written survey that you may receive in the mail after your visit with us. Thank you!             Your Updated Medication List - Protect others around you: Learn how to safely use, store and throw away your medicines at www.disposemymeds.org.          This list is accurate as of 7/13/18 11:59 PM.  Always use your most recent med list.                   Brand Name Dispense Instructions for use Diagnosis    aspirin 81 MG tablet      Take 1 tablet by mouth daily.        CENTRUM SILVER per tablet      Take 1 tablet by mouth daily. 1 tablet        cod liver oil Caps capsule      1 daily        SUPER B COMPLEX Tabs      1 daily        VIACTIV 500-500-40 MG-UNT-MCG Chew   Generic drug:  calcium-vitamin D-vitamin K      1 daily        vitamin D 400 units tablet      1daily

## 2018-07-24 DIAGNOSIS — I10 HYPERTENSION GOAL BP (BLOOD PRESSURE) < 140/90: Primary | ICD-10-CM

## 2018-07-24 RX ORDER — AMLODIPINE BESYLATE 5 MG/1
5 TABLET ORAL DAILY
Qty: 90 TABLET | Refills: 0 | Status: SHIPPED | OUTPATIENT
Start: 2018-07-24 | End: 2018-08-13

## 2018-07-24 NOTE — TELEPHONE ENCOUNTER
Signed Prescriptions:                        Disp   Refills    amLODIPine (NORVASC) 5 MG tablet           90 tab*0        Sig: Take 1 tablet (5 mg) by mouth daily Must follow up           for further refills  Authorizing Provider: ROSALIND MARIEE  Ordering User: ANG JAMES RN refilled medication per [facility] Refill Protocol.     Ang James RN

## 2018-07-25 ENCOUNTER — DOCUMENTATION ONLY (OUTPATIENT)
Dept: LAB | Facility: CLINIC | Age: 83
End: 2018-07-25

## 2018-07-25 DIAGNOSIS — N18.30 CKD (CHRONIC KIDNEY DISEASE) STAGE 3, GFR 30-59 ML/MIN (H): ICD-10-CM

## 2018-07-25 DIAGNOSIS — M81.0 AGE-RELATED OSTEOPOROSIS WITHOUT CURRENT PATHOLOGICAL FRACTURE: ICD-10-CM

## 2018-07-25 DIAGNOSIS — I10 HYPERTENSION GOAL BP (BLOOD PRESSURE) < 140/90: Primary | ICD-10-CM

## 2018-08-03 DIAGNOSIS — I10 HYPERTENSION GOAL BP (BLOOD PRESSURE) < 140/90: ICD-10-CM

## 2018-08-03 DIAGNOSIS — N18.30 CKD (CHRONIC KIDNEY DISEASE) STAGE 3, GFR 30-59 ML/MIN (H): ICD-10-CM

## 2018-08-03 DIAGNOSIS — M81.0 AGE-RELATED OSTEOPOROSIS WITHOUT CURRENT PATHOLOGICAL FRACTURE: ICD-10-CM

## 2018-08-03 LAB
ANION GAP SERPL CALCULATED.3IONS-SCNC: 5 MMOL/L (ref 3–14)
BUN SERPL-MCNC: 15 MG/DL (ref 7–30)
CALCIUM SERPL-MCNC: 9.3 MG/DL (ref 8.5–10.1)
CHLORIDE SERPL-SCNC: 106 MMOL/L (ref 94–109)
CHOLEST SERPL-MCNC: 188 MG/DL
CO2 SERPL-SCNC: 31 MMOL/L (ref 20–32)
CREAT SERPL-MCNC: 0.89 MG/DL (ref 0.52–1.04)
DEPRECATED CALCIDIOL+CALCIFEROL SERPL-MC: 54 UG/L (ref 20–75)
GFR SERPL CREATININE-BSD FRML MDRD: 61 ML/MIN/1.7M2
GLUCOSE SERPL-MCNC: 89 MG/DL (ref 70–99)
HDLC SERPL-MCNC: 73 MG/DL
HGB BLD-MCNC: 13.2 G/DL (ref 11.7–15.7)
LDLC SERPL CALC-MCNC: 88 MG/DL
NONHDLC SERPL-MCNC: 115 MG/DL
POTASSIUM SERPL-SCNC: 4.2 MMOL/L (ref 3.4–5.3)
SODIUM SERPL-SCNC: 142 MMOL/L (ref 133–144)
TRIGL SERPL-MCNC: 134 MG/DL
TSH SERPL DL<=0.005 MIU/L-ACNC: 1.52 MU/L (ref 0.4–4)

## 2018-08-03 PROCEDURE — 82306 VITAMIN D 25 HYDROXY: CPT | Performed by: INTERNAL MEDICINE

## 2018-08-03 PROCEDURE — 84443 ASSAY THYROID STIM HORMONE: CPT | Performed by: INTERNAL MEDICINE

## 2018-08-03 PROCEDURE — 36415 COLL VENOUS BLD VENIPUNCTURE: CPT | Performed by: INTERNAL MEDICINE

## 2018-08-03 PROCEDURE — 80061 LIPID PANEL: CPT | Performed by: INTERNAL MEDICINE

## 2018-08-03 PROCEDURE — 80048 BASIC METABOLIC PNL TOTAL CA: CPT | Performed by: INTERNAL MEDICINE

## 2018-08-03 PROCEDURE — 85018 HEMOGLOBIN: CPT | Performed by: INTERNAL MEDICINE

## 2018-08-03 NOTE — LETTER
[id number] [facility] [place] [place] [address]    August 6, 2018    Priscilla Shaw  [address] [address]          Dear Priscilla,    Normal Vitamin D. Normal electrolytes. Normal kidney function. Good cholesterol. Normal thyroid    Enclosed is a copy of your results.     Results for orders placed or performed in visit on 08/03/18   **Basic metabolic panel FUTURE anytime   Result Value Ref Range    Sodium 142 133 - 144 mmol/L    Potassium 4.2 3.4 - 5.3 mmol/L    Chloride 106 94 - 109 mmol/L    Carbon Dioxide 31 20 - 32 mmol/L    Anion Gap 5 3 - 14 mmol/L    Glucose 89 70 - 99 mg/dL    Urea Nitrogen 15 7 - 30 mg/dL    Creatinine 0.89 0.52 - 1.04 mg/dL    GFR Estimate 61 >60 mL/min/1.7m2    GFR Estimate If Black 73 >60 mL/min/1.7m2    Calcium 9.3 8.5 - 10.1 mg/dL   Lipid panel reflex to direct LDL Fasting   Result Value Ref Range    Cholesterol 188 <200 mg/dL    Triglycerides 134 <150 mg/dL    HDL Cholesterol 73 >49 mg/dL    LDL Cholesterol Calculated 88 <100 mg/dL    Non HDL Cholesterol 115 <130 mg/dL   **TSH with free T4 reflex FUTURE anytime   Result Value Ref Range    TSH 1.52 0.40 - 4.00 mU/L   **Hemoglobin FUTURE anytime   Result Value Ref Range    Hemoglobin 13.2 11.7 - 15.7 g/dL   **Vitamin D Deficiency FUTURE 2mo   Result Value Ref Range    Vitamin D Deficiency screening 54 20 - 75 ug/L       If you have any questions or concerns, please call myself or my nurse at [phone].      Sincerely,        Comfort Osborne MD /gabino

## 2018-08-13 ENCOUNTER — OFFICE VISIT (OUTPATIENT)
Dept: INTERNAL MEDICINE | Facility: CLINIC | Age: 83
End: 2018-08-13
Payer: COMMERCIAL

## 2018-08-13 VITALS
WEIGHT: 147 LBS | RESPIRATION RATE: 20 BRPM | HEART RATE: 83 BPM | HEIGHT: 60 IN | BODY MASS INDEX: 28.86 KG/M2 | SYSTOLIC BLOOD PRESSURE: 146 MMHG | DIASTOLIC BLOOD PRESSURE: 82 MMHG | OXYGEN SATURATION: 100 % | TEMPERATURE: 97.1 F

## 2018-08-13 DIAGNOSIS — M81.0 AGE-RELATED OSTEOPOROSIS WITHOUT CURRENT PATHOLOGICAL FRACTURE: ICD-10-CM

## 2018-08-13 DIAGNOSIS — Z00.00 ROUTINE GENERAL MEDICAL EXAMINATION AT A HEALTH CARE FACILITY: Primary | ICD-10-CM

## 2018-08-13 DIAGNOSIS — N18.30 CKD (CHRONIC KIDNEY DISEASE) STAGE 3, GFR 30-59 ML/MIN (H): ICD-10-CM

## 2018-08-13 DIAGNOSIS — I10 HYPERTENSION GOAL BP (BLOOD PRESSURE) < 140/90: ICD-10-CM

## 2018-08-13 DIAGNOSIS — Z13.5 SCREENING FOR DIABETIC RETINOPATHY: ICD-10-CM

## 2018-08-13 LAB
CREAT UR-MCNC: 26 MG/DL
MICROALBUMIN UR-MCNC: <5 MG/L
MICROALBUMIN/CREAT UR: NORMAL MG/G CR (ref 0–25)

## 2018-08-13 PROCEDURE — 99397 PER PM REEVAL EST PAT 65+ YR: CPT | Performed by: INTERNAL MEDICINE

## 2018-08-13 PROCEDURE — 82043 UR ALBUMIN QUANTITATIVE: CPT | Performed by: INTERNAL MEDICINE

## 2018-08-13 RX ORDER — AMLODIPINE BESYLATE 5 MG/1
5 TABLET ORAL DAILY
Qty: 90 TABLET | Refills: 3 | Status: SHIPPED | OUTPATIENT
Start: 2018-08-13 | End: 2019-08-16

## 2018-08-13 NOTE — MR AVS SNAPSHOT
After Visit Summary   8/13/2018    Priscilla Shaw    MRN: [id number]           Patient Information     Date Of Birth          1935        Visit Information        Provider Department      8/13/2018 11:00 AM Comfort Osborne MD [facility]        Today's Diagnoses     Routine general medical examination at a health care facility    -  1    Hypertension goal BP (blood pressure) < 140/90        Age-related osteoporosis without current pathological fracture        CKD (chronic kidney disease) stage 3, GFR 30-59 ml/min        Screening for diabetic retinopathy          Care Instructions    The [facility] pharmacist will call you about the Prolia.  Obtain the Shingrix shingles vaccine.      Preventive Health Recommendations    Female Ages 65 +    Yearly exam:     See your health care provider every year in order to  o Review health changes.   o Discuss preventive care.    o Review your medicines if your doctor has prescribed any.      You no longer need a yearly Pap test unless you've had an abnormal Pap test in the past 10 years. If you have vaginal symptoms, such as bleeding or discharge, be sure to talk with your provider about a Pap test.      Every 1 to 2 years, have a mammogram.  If you are over 69, talk with your health care provider about whether or not you want to continue having screening mammograms.      Every 10 years, have a colonoscopy. Or, have a yearly FIT test (stool test). These exams will check for colon cancer.       Have a cholesterol test every 5 years, or more often if your doctor advises it.       Have a diabetes test (fasting glucose) every three years. If you are at risk for diabetes, you should have this test more often.       At age 65, have a bone density scan (DEXA) to check for osteoporosis (brittle bone disease).    Shots:    Get a flu shot each year.    Get a tetanus shot every 10 years.    Talk to your doctor about your pneumonia vaccines. There are now two you  should receive - Pneumovax (PPSV 23) and Prevnar (PCV 13).    Talk to your pharmacist about the shingles vaccine.    Talk to your doctor about the hepatitis B vaccine.    Nutrition:     Eat at least 5 servings of fruits and vegetables each day.      Eat whole-grain bread, whole-wheat pasta and brown rice instead of white grains and rice.      Get adequate Calcium and Vitamin D.     Lifestyle    Exercise at least 150 minutes a week (30 minutes a day, 5 days a week). This will help you control your weight and prevent disease.      Limit alcohol to one drink per day.      No smoking.       Wear sunscreen to prevent skin cancer.       See your dentist twice a year for an exam and cleaning.      See your eye doctor every 1 to 2 years to screen for conditions such as glaucoma, macular degeneration and cataracts.    PROLIA  Risk Evaluation and Mitigation Strategy Best Practice Advisory    In May 2015, the FDA required an update to the PROLIA  (denosumab) REMS (Risk Evaluation and Mitigation Strategy) to inform both providers and patients about potential serious risks related to PROLIA .    These potential serious risks include:    Hypocalcemia    Osteonecrosis of the jaw    Atypical femoral fractures    Serious Infections    Dermatologic reactions    To ensure compliance with these requirements [facility] has developed a workflow for those patients who will receive PROLIA  at clinic.  This workflow includes insurance verification, patient scheduling, patient education, and documentation. Registered Nurses in the clinic should have completed eLearning related to the REMS and the clinic workflow.  Refer to them to initiate this process.  This workflow does not need to be executed if the patient is to receive PROLIA  injection at [facility].       The  has put together a Patient Education Toolkit.  Copies of these handouts may be available your clinic. Patients must receive the Patient Brochure and  Medication Guide when receiving injection at clinic.  These will be attached to the injection ordered from [employer] to the clinic. Links to handouts:  Patient Counseling Chart for Healthcare Providers  Patient Brochure  Prescribing Information  Medication Guide    If you are having trouble accessing the above links, these documents can be found at: http://www.itembase.com/xyng-mndeinaapb-cflufljimh-strategy/index.html    The role of healthcare provider includes reviewing patient education materials with the patient, advising patients to seek medical attention if they have signs or symptoms of one of the serious risks, and provide patients a copy of the Patient Brochure and Medication Guide when receiving their injection.      Due to the risk of hypocalcemia the Prescribing Information for PROLIA  recommends that calcium and mineral levels (magnesium and phosphorus) be checked within 14 days of injection for those predisposed to hypocalcemia.                Follow-ups after your visit        Follow-up notes from your care team     Return in about 1 year (around 8/13/2019).      Future tests that were ordered for you today     Open Future Orders        Priority Expected Expires Ordered    Calcium Routine 8/27/2018 2/9/2019 8/13/2018    Magnesium Routine 8/27/2018 2/9/2019 8/13/2018    Phosphorus Routine 8/27/2018 2/9/2019 8/13/2018            Who to contact     If you have questions or need follow up information about today's clinic visit or your schedule please contact [facility] directly at [phone].  Normal or non-critical lab and imaging results will be communicated to you by MyChart, letter or phone within 4 business days after the clinic has received the results. If you do not hear from us within 7 days, please contact the clinic through MyChart or phone. If you have a critical or abnormal lab result, we will notify you by phone as soon as possible.  Submit  "refill requests through [employer] or call your pharmacy and they will forward the refill request to us. Please allow 3 business days for your refill to be completed.          Additional Information About Your Visit        [employer] Information     [employer] lets you send messages to your doctor, view your test results, renew your prescriptions, schedule appointments and more. To sign up, go to [email or web address] . Click on \"Log in\" on the left side of the screen, which will take you to the Welcome page. Then click on \"Sign up Now\" on the right side of the page.     You will be asked to enter the access code listed below, as well as some personal information. Please follow the directions to create your username and password.     Your access code is: [email or web address]  Expires: 2018  7:47 AM     Your access code will  in 90 days. If you need help or a new code, please call your [facility] clinic or [phone].        Care EveryWhere ID     This is your Care EveryWhere ID. This could be used by other organizations to access your [facility] medical records  [id number]        Your Vitals Were     Pulse Temperature Respirations Height Pulse Oximetry BMI (Body Mass Index)    83 97.1  F (36.2  C) (Oral) 20 4' 11.84\" (1.52 m) 100% 28.86 kg/m2       Blood Pressure from Last 3 Encounters:   18 146/82   18 118/64   17 138/82    Weight from Last 3 Encounters:   18 147 lb (66.7 kg)   17 144 lb 3.2 oz (65.4 kg)   16 140 lb (63.5 kg)              We Performed the Following     Albumin Random Urine Quantitative with Creat Ratio     C DENOSUMAB INJECTION, 1 MG          Today's Medication Changes          These changes are accurate as of 18 11:43 AM.  If you have any questions, ask your nurse or doctor.               Start taking these medicines.        Dose/Directions    denosumab 60 MG/ML Soln injection   Commonly known as:  PROLIA   Used for:  Age-related osteoporosis without current " pathological fracture, CKD (chronic kidney disease) stage 3, GFR 30-59 ml/min   Started by:  Comfort Osborne MD        Dose:  60 mg   Inject 1 mL (60 mg) Subcutaneous every 6 months   Quantity:  1 mL   Refills:  1         These medicines have changed or have updated prescriptions.        Dose/Directions    amLODIPine 5 MG tablet   Commonly known as:  NORVASC   This may have changed:  additional instructions   Used for:  Hypertension goal BP (blood pressure) < 140/90   Changed by:  Comfort Osborne MD        Dose:  5 mg   Take 1 tablet (5 mg) by mouth daily   Quantity:  90 tablet   Refills:  3            Where to get your medicines      These medications were sent to [place] [address] [place] [facility] - [place], [place] - [address]  [address] [address]     Phone:  [phone]     amLODIPine 5 MG tablet         Some of these will need a paper prescription and others can be bought over the counter.  Ask your nurse if you have questions.     You don't need a prescription for these medications     denosumab 60 MG/ML Soln injection                Primary Care Provider Office Phone # Fax #    Comfort Osborne [phone]       [address] [place] [address]        Equal Access to Services     BENITA PEREYRA AH: Hadii keri ku hadasho Soomaali, waaxda luqadaha, qaybta kaalmada adeegyada, raj garciain haynadeenn sarah johnson. So [facility] [phone].    ATENCIÓN: Si habla español, tiene a garcía disposición servicios gratuitos de asistencia lingüística. Llame al [phone].    We comply with applicable federal civil rights laws and [place] laws. We do not discriminate on the basis of race, color, national origin, age, disability, sex, sexual orientation, or gender identity.            Thank you!     Thank you for choosing [facility]  for your care. Our goal is always to provide you with excellent care. Hearing back from our patients is one way we can continue to  improve our services. Please take a few minutes to complete the written survey that you may receive in the mail after your visit with us. Thank you!             Your Updated Medication List - Protect others around you: Learn how to safely use, store and throw away your medicines at www.disposemymeds.org.          This list is accurate as of 8/13/18 11:43 AM.  Always use your most recent med list.                   Brand Name Dispense Instructions for use Diagnosis    amLODIPine 5 MG tablet    NORVASC    90 tablet    Take 1 tablet (5 mg) by mouth daily    Hypertension goal BP (blood pressure) < 140/90       aspirin 81 MG tablet      Take 1 tablet by mouth daily.        CENTRUM SILVER per tablet      Take 1 tablet by mouth daily. 1 tablet        cod liver oil Caps capsule      1 daily        denosumab 60 MG/ML Soln injection    PROLIA    1 mL    Inject 1 mL (60 mg) Subcutaneous every 6 months    Age-related osteoporosis without current pathological fracture, CKD (chronic kidney disease) stage 3, GFR 30-59 ml/min       magnesium chloride 535 (64 Mg) MG Tbec CR tablet      Take 535 mg by mouth daily        SUPER B COMPLEX Tabs      1 daily        VIACTIV 500-500-40 MG-UNT-MCG Chew   Generic drug:  calcium-vitamin D-vitamin K      1 daily        vitamin D 400 units tablet      1daily

## 2018-08-13 NOTE — PROGRESS NOTES
SUBJECTIVE:   Priscilla Shaw is a 83 year old female who presents for Preventive Visit.      Are you in the first 12 months of your Medicare Part B coverage?  No    Healthy Habits:    Do you get at least three servings of calcium containing foods daily (dairy, green leafy vegetables, etc.)? yes    Amount of exercise or daily activities, outside of work: 3 day(s) per week    Problems taking medications regularly No    Medication side effects: No    Have you had an eye exam in the past two years? yes    Do you see a dentist twice per year? yes    Do you have sleep apnea, excessive snoring or daytime drowsiness?yes- snoring      Ability to successfully perform activities of daily living: Yes, no assistance needed    Home safety:  none identified     Hearing impairment: No    Fall risk:       click delete button to remove this line now    COGNITIVE SCREEN  1) Repeat 3 items (Leader, Season, Table)    2) Clock draw: NORMAL  3) 3 item recall: Recalls 2 objects   Results: NORMAL clock, 1-2 items recalled: COGNITIVE IMPAIRMENT LESS LIKELY    Mini-CogTM Copyright CRISTINA Hernandez. Licensed by the author for use in [facility]; reprinted with permission (jasbir@[facility]). All rights reserved.                Reviewed and updated as needed this visit by clinical staff  Tobacco  Allergies  Meds  Problems         Reviewed and updated as needed this visit by Provider  Problems        Social History   Substance Use Topics     Smoking status: Former Smoker     Types: Cigarettes     Quit date: 9/16/2009     Smokeless tobacco: Never Used     Alcohol use Yes       If you drink alcohol do you typically have >3 drinks per day or >7 drinks per week? No                        Today's PHQ-2 Score:   PHQ-2 ( 1999 [employer]) 8/4/2017 7/29/2016   Q1: Little interest or pleasure in doing things 0 0   Q2: Feeling down, depressed or hopeless 0 0   PHQ-2 Score 0 0   Q1: Little interest or pleasure in doing things Not at all -   Q2: Feeling  down, depressed or hopeless Not at all -   PHQ-2 Score 0 -       Do you feel safe in your environment - Yes    Do you have a Health Care Directive?: Yes: Advance Directive has been received and scanned.    Current providers sharing in care for this patient include:   Patient Care Team:  Comfort Osborne MD as PCP - General    The following health maintenance items are reviewed in Epic and correct as of today:  Health Maintenance   Topic Date Due     EYE EXAM Q1 YEAR  07/29/2017     FALL RISK ASSESSMENT  08/04/2018     PHQ-2 Q1 YR  08/04/2018     INFLUENZA VACCINE (1) 09/01/2018     BMP Q1 YR  08/03/2019     DEXA Q2 YR  01/26/2020     ADVANCE DIRECTIVE PLANNING Q5 YRS  11/11/2021     TETANUS IMMUNIZATION (SYSTEM ASSIGNED)  05/24/2023     LIPID MONITORING Q5 YEARS  08/03/2023     COLONOSCOPY Q10 YR  09/23/2024     PNEUMOCOCCAL  Completed     BP Readings from Last 3 Encounters:   08/13/18 147/86   07/13/18 118/64   08/18/17 138/82    Wt Readings from Last 3 Encounters:   08/13/18 147 lb (66.7 kg)   08/04/17 144 lb 3.2 oz (65.4 kg)   07/29/16 140 lb (63.5 kg)                  Patient Active Problem List   Diagnosis     CARDIOVASCULAR SCREENING; LDL GOAL LESS THAN 130     Hypertension goal BP (blood pressure) < 140/90     Osteoporosis     Atrophy, vulva     Health Care Home     Advance care planning     Vertigo     Mastodynia     Cataract     CKD (chronic kidney disease) stage 3, GFR 30-59 ml/min     Past Surgical History:   Procedure Laterality Date     APPENDECTOMY       HYSTERECTOMY, BERNADETTE  1983       Social History   Substance Use Topics     Smoking status: Former Smoker     Types: Cigarettes     Quit date: 9/16/2009     Smokeless tobacco: Never Used     Alcohol use Yes     Family History   Problem Relation Age of Onset     Hypertension Mother      HEART DISEASE Sister      pacemaker      Hypertension Sister      HEART DISEASE Son      HEART DISEASE Sister      Osteoperosis Sister          Current Outpatient  "Prescriptions   Medication Sig Dispense Refill     amLODIPine (NORVASC) 5 MG tablet Take 1 tablet (5 mg) by mouth daily Must follow up for further refills 90 tablet 0     aspirin 81 MG tablet Take 1 tablet by mouth daily.       COD LIVER OIL OR CAPS 1 daily        magnesium chloride 535 (64 Mg) MG TBEC CR tablet Take 535 mg by mouth daily       Multiple Vitamins-Minerals (CENTRUM SILVER) per tablet Take 1 tablet by mouth daily. 1 tablet       SUPER B COMPLEX OR TABS 1 daily        VIACTIV 500-500-40 MG-UNT-MCG OR CHEW 1 daily       VITAMIN D 400 UNIT OR TABS 1daily       Allergies   Allergen Reactions     Fosamax [Alendronic Acid] Other (See Comments)     Stomach pain           ROS:   ROS: 10 point ROS neg other than the symptoms noted above in the HPI.     OBJECTIVE:   /86  Pulse 83  Temp 97.1  F (36.2  C) (Oral)  Resp 20  Ht 4' 11.84\" (1.52 m)  Wt 147 lb (66.7 kg)  SpO2 100%  BMI 28.86 kg/m2 Estimated body mass index is 28.86 kg/(m^2) as calculated from the following:    Height as of this encounter: 4' 11.84\" (1.52 m).    Weight as of this encounter: 147 lb (66.7 kg).  EXAM:   GENERAL APPEARANCE: healthy, alert and no distress  EYES: Eyes grossly normal to inspection, PERRL and conjunctivae and sclerae normal  HENT: ear canals and TM's normal and nose and mouth without ulcers or lesions  NECK: no adenopathy, no asymmetry, masses, or scars and thyroid normal to palpation  RESP: lungs clear to auscultation - no rales, rhonchi or wheezes  BREAST: normal without masses, tenderness or nipple discharge and no palpable axillary masses or adenopathy  CV: regular rates and rhythm and normal S1 S2, no S3 or S4  LYMPHATICS: normal ant/post cervical and supraclavicular nodes  ABDOMEN: soft, nontender, without hepatosplenomegaly or masses and bowel sounds normal   (female): normal vulva  MS: extremities normal- no gross deformities noted  SKIN: no suspicious lesions or rashes  NEURO: Normal strength and tone, " mentation intact and speech normal  PSYCH: mentation appears normal and affect normal/bright  No edema   1+ posterior tibial pulses bilateral          Diagnostic Test Results:  Results for orders placed or performed in visit on 08/03/18   **Basic metabolic panel FUTURE anytime   Result Value Ref Range    Sodium 142 133 - 144 mmol/L    Potassium 4.2 3.4 - 5.3 mmol/L    Chloride 106 94 - 109 mmol/L    Carbon Dioxide 31 20 - 32 mmol/L    Anion Gap 5 3 - 14 mmol/L    Glucose 89 70 - 99 mg/dL    Urea Nitrogen 15 7 - 30 mg/dL    Creatinine 0.89 0.52 - 1.04 mg/dL    GFR Estimate 61 >60 mL/min/1.7m2    GFR Estimate If Black 73 >60 mL/min/1.7m2    Calcium 9.3 8.5 - 10.1 mg/dL   Lipid panel reflex to direct LDL Fasting   Result Value Ref Range    Cholesterol 188 <200 mg/dL    Triglycerides 134 <150 mg/dL    HDL Cholesterol 73 >49 mg/dL    LDL Cholesterol Calculated 88 <100 mg/dL    Non HDL Cholesterol 115 <130 mg/dL   **TSH with free T4 reflex FUTURE anytime   Result Value Ref Range    TSH 1.52 0.40 - 4.00 mU/L   **Hemoglobin FUTURE anytime   Result Value Ref Range    Hemoglobin 13.2 11.7 - 15.7 g/dL   **Vitamin D Deficiency FUTURE 2mo   Result Value Ref Range    Vitamin D Deficiency screening 54 20 - 75 ug/L       ASSESSMENT / PLAN:   1. Routine general medical examination at a health care facility      2. Hypertension goal BP (blood pressure) < 140/90    - amLODIPine (NORVASC) 5 MG tablet; Take 1 tablet (5 mg) by mouth daily  Dispense: 90 tablet; Refill: 3    3. Age-related osteoporosis without current pathological fracture  Discussed with patient risks and benefits of treatment - could do Reclast but had problems with oral bisphosphonates.  History of fluctuating renal disease as well which makes Prolia a good option.  Information sent home with patient and she will consider her options.   - denosumab (PROLIA) 60 MG/ML SOLN injection; Inject 1 mL (60 mg) Subcutaneous every 6 months  Dispense: 1 mL; Refill: 1  - C DENOSUMAB  "INJECTION, 1 MG  - Calcium; Future  - Magnesium; Future  - Phosphorus; Future    4. CKD (chronic kidney disease) stage 3, GFR 30-59 ml/min    - denosumab (PROLIA) 60 MG/ML SOLN injection; Inject 1 mL (60 mg) Subcutaneous every 6 months  Dispense: 1 mL; Refill: 1  - C DENOSUMAB INJECTION, 1 MG  - Calcium; Future  - Magnesium; Future  - Phosphorus; Future  - Albumin Random Urine Quantitative with Creat Ratio    5. Screening for diabetic retinopathy        End of Life Planning:  Patient currently has an advanced directive: Yes.  Practitioner is supportive of decision.    COUNSELING:  Reviewed preventive health counseling, as reflected in patient instructions       Regular exercise       Healthy diet/nutrition    BP Readings from Last 1 Encounters:   08/13/18 147/86     Estimated body mass index is 28.86 kg/(m^2) as calculated from the following:    Height as of this encounter: 4' 11.84\" (1.52 m).    Weight as of this encounter: 147 lb (66.7 kg).           reports that she quit smoking about 8 years ago. Her smoking use included Cigarettes. She has never used smokeless tobacco.      Appropriate preventive services were discussed with this patient, including applicable screening as appropriate for cardiovascular disease, diabetes, osteopenia/osteoporosis, and glaucoma.  As appropriate for age/gender, discussed screening for colorectal cancer, prostate cancer, breast cancer, and cervical cancer. Checklist reviewing preventive services available has been given to the patient.    Reviewed patients plan of care and provided an AVS. The Basic Care Plan (routine screening as documented in Health Maintenance) for Priscilla meets the Care Plan requirement. This Care Plan has been established and reviewed with the Patient.    Counseling Resources:  ATP IV Guidelines  Pooled Cohorts Equation Calculator  Breast Cancer Risk Calculator  FRAX Risk Assessment  ICSI Preventive Guidelines  Dietary Guidelines for Americans, 2010  USDA's " MyPlate  ASA Prophylaxis  Lung CA Screening    Comofrt Osborne MD  [facility] [place]    Patient Instructions   The [facility] pharmacist will call you about the Prolia.  Obtain the Shingrix shingles vaccine.      Preventive Health Recommendations    Female Ages 65 +    Yearly exam:     See your health care provider every year in order to  o Review health changes.   o Discuss preventive care.    o Review your medicines if your doctor has prescribed any.      You no longer need a yearly Pap test unless you've had an abnormal Pap test in the past 10 years. If you have vaginal symptoms, such as bleeding or discharge, be sure to talk with your provider about a Pap test.      Every 1 to 2 years, have a mammogram.  If you are over 69, talk with your health care provider about whether or not you want to continue having screening mammograms.      Every 10 years, have a colonoscopy. Or, have a yearly FIT test (stool test). These exams will check for colon cancer.       Have a cholesterol test every 5 years, or more often if your doctor advises it.       Have a diabetes test (fasting glucose) every three years. If you are at risk for diabetes, you should have this test more often.       At age 65, have a bone density scan (DEXA) to check for osteoporosis (brittle bone disease).    Shots:    Get a flu shot each year.    Get a tetanus shot every 10 years.    Talk to your doctor about your pneumonia vaccines. There are now two you should receive - Pneumovax (PPSV 23) and Prevnar (PCV 13).    Talk to your pharmacist about the shingles vaccine.    Talk to your doctor about the hepatitis B vaccine.    Nutrition:     Eat at least 5 servings of fruits and vegetables each day.      Eat whole-grain bread, whole-wheat pasta and brown rice instead of white grains and rice.      Get adequate Calcium and Vitamin D.     Lifestyle    Exercise at least 150 minutes a week (30 minutes a day, 5 days a week). This will help you control your weight  and prevent disease.      Limit alcohol to one drink per day.      No smoking.       Wear sunscreen to prevent skin cancer.       See your dentist twice a year for an exam and cleaning.      See your eye doctor every 1 to 2 years to screen for conditions such as glaucoma, macular degeneration and cataracts.    PROLIA  Risk Evaluation and Mitigation Strategy Best Practice Advisory    In May 2015, the FDA required an update to the PROLIA  (denosumab) REMS (Risk Evaluation and Mitigation Strategy) to inform both providers and patients about potential serious risks related to PROLIA .    These potential serious risks include:    Hypocalcemia    Osteonecrosis of the jaw    Atypical femoral fractures    Serious Infections    Dermatologic reactions    To ensure compliance with these requirements [facility] has developed a workflow for those patients who will receive PROLIA  at clinic.  This workflow includes insurance verification, patient scheduling, patient education, and documentation. Registered Nurses in the clinic should have completed eLearning related to the REMS and the clinic workflow.  Refer to them to initiate this process.  This workflow does not need to be executed if the patient is to receive PROLIA  injection at [facility].       The  has put together a Patient Education Toolkit.  Copies of these handouts may be available your clinic. Patients must receive the Patient Brochure and Medication Guide when receiving injection at clinic.  These will be attached to the injection ordered from [facility] to the clinic. Links to handouts:  Patient Counseling Chart for Healthcare Providers  Patient Brochure  Prescribing Information  Medication Guide    If you are having trouble accessing the above links, these documents can be found at: [email or web address]    The role of healthcare provider includes reviewing  patient education materials with the patient, advising patients to seek medical attention if they have signs or symptoms of one of the serious risks, and provide patients a copy of the Patient Brochure and Medication Guide when receiving their injection.      Due to the risk of hypocalcemia the Prescribing Information for PROLIA  recommends that calcium and mineral levels (magnesium and phosphorus) be checked within 14 days of injection for those predisposed to hypocalcemia.

## 2018-08-13 NOTE — PATIENT INSTRUCTIONS
The [facility] pharmacist will call you about the Prolia.  Obtain the Shingrix shingles vaccine.      Preventive Health Recommendations    Female Ages 65 +    Yearly exam:     See your health care provider every year in order to  o Review health changes.   o Discuss preventive care.    o Review your medicines if your doctor has prescribed any.      You no longer need a yearly Pap test unless you've had an abnormal Pap test in the past 10 years. If you have vaginal symptoms, such as bleeding or discharge, be sure to talk with your provider about a Pap test.      Every 1 to 2 years, have a mammogram.  If you are over 69, talk with your health care provider about whether or not you want to continue having screening mammograms.      Every 10 years, have a colonoscopy. Or, have a yearly FIT test (stool test). These exams will check for colon cancer.       Have a cholesterol test every 5 years, or more often if your doctor advises it.       Have a diabetes test (fasting glucose) every three years. If you are at risk for diabetes, you should have this test more often.       At age 65, have a bone density scan (DEXA) to check for osteoporosis (brittle bone disease).    Shots:    Get a flu shot each year.    Get a tetanus shot every 10 years.    Talk to your doctor about your pneumonia vaccines. There are now two you should receive - Pneumovax (PPSV 23) and Prevnar (PCV 13).    Talk to your pharmacist about the shingles vaccine.    Talk to your doctor about the hepatitis B vaccine.    Nutrition:     Eat at least 5 servings of fruits and vegetables each day.      Eat whole-grain bread, whole-wheat pasta and brown rice instead of white grains and rice.      Get adequate Calcium and Vitamin D.     Lifestyle    Exercise at least 150 minutes a week (30 minutes a day, 5 days a week). This will help you control your weight and prevent disease.      Limit alcohol to one drink per day.      No smoking.       Wear sunscreen to prevent skin  cancer.       See your dentist twice a year for an exam and cleaning.      See your eye doctor every 1 to 2 years to screen for conditions such as glaucoma, macular degeneration and cataracts.    PROLIA  Risk Evaluation and Mitigation Strategy Best Practice Advisory    In May 2015, the FDA required an update to the PROLIA  (denosumab) REMS (Risk Evaluation and Mitigation Strategy) to inform both providers and patients about potential serious risks related to PROLIA .    These potential serious risks include:    Hypocalcemia    Osteonecrosis of the jaw    Atypical femoral fractures    Serious Infections    Dermatologic reactions    To ensure compliance with these requirements [facility] has developed a workflow for those patients who will receive PROLIA  at clinic.  This workflow includes insurance verification, patient scheduling, patient education, and documentation. Registered Nurses in the clinic should have completed eLearning related to the REMS and the clinic workflow.  Refer to them to initiate this process.  This workflow does not need to be executed if the patient is to receive PROLIA  injection at [facility].       The  has put together a Patient Education Toolkit.  Copies of these handouts may be available your clinic. Patients must receive the Patient Brochure and Medication Guide when receiving injection at clinic.  These will be attached to the injection ordered from [facility] [employer] to the clinic. Links to handouts:  Patient Counseling Chart for Healthcare Providers  Patient Brochure  Prescribing Information  Medication Guide    If you are having trouble accessing the above links, these documents can be found at: [email or web address]    The role of healthcare provider includes reviewing patient education materials with the patient, advising patients to seek medical attention if they have signs or  symptoms of one of the serious risks, and provide patients a copy of the Patient Brochure and Medication Guide when receiving their injection.      Due to the risk of hypocalcemia the Prescribing Information for PROLIA  recommends that calcium and mineral levels (magnesium and phosphorus) be checked within 14 days of injection for those predisposed to hypocalcemia.

## 2018-08-13 NOTE — LETTER
[id number] [facility] [place] [place] [address]    August 14, 2018    Priscilla Shaw  [address] [facility] [address]          Dear Priscilla    Normal urine    Enclosed is a copy of your results.     Results for orders placed or performed in visit on 08/13/18   Albumin Random Urine Quantitative with Creat Ratio   Result Value Ref Range    Creatinine Urine 26 mg/dL    Albumin Urine mg/L <5 mg/L    Albumin Urine mg/g Cr Unable to calculate due to low value 0 - 25 mg/g Cr       If you have any questions or concerns, please call myself or my nurse at [phone].      Sincerely,        Comfort Osborne MD/gabino

## 2018-08-14 ENCOUNTER — TELEPHONE (OUTPATIENT)
Dept: PHARMACY | Facility: CLINIC | Age: 83
End: 2018-08-14

## 2018-08-14 NOTE — TELEPHONE ENCOUNTER
Submitted Prolia insurance verification request via [employer].  Please leave encounter open, will await response.    Miri Sloan PharmD, [facility]  Medication Therapy Management Provider  Pager: [phone]

## 2018-08-21 NOTE — TELEPHONE ENCOUNTER
Received covrage information back from [employer].  Prolia is covered 80%, pt will be responsible for 20% for the cost/administration of Prolia (roughly $200).  Routing to  to call pt to schedule RN injection if she's interested.    Miri Sloan, PharmD, [facility]  Medication Therapy Management Provider  Pager: [phone]

## 2018-08-22 NOTE — TELEPHONE ENCOUNTER
Patient returned call.  She is okay with paying about $200.  Lab appointment scheduled for Friday, August 24th at 9:15 a.m.    If labs come back okay, will call patient to schedule injection and have MA order medication. Jessy Chow,

## 2018-08-24 DIAGNOSIS — N18.30 CKD (CHRONIC KIDNEY DISEASE) STAGE 3, GFR 30-59 ML/MIN (H): ICD-10-CM

## 2018-08-24 DIAGNOSIS — M81.0 AGE-RELATED OSTEOPOROSIS WITHOUT CURRENT PATHOLOGICAL FRACTURE: ICD-10-CM

## 2018-08-24 LAB
CALCIUM SERPL-MCNC: 9.5 MG/DL (ref 8.5–10.1)
MAGNESIUM SERPL-MCNC: 2 MG/DL (ref 1.6–2.3)
PHOSPHATE SERPL-MCNC: 3.2 MG/DL (ref 2.5–4.5)

## 2018-08-24 PROCEDURE — 83735 ASSAY OF MAGNESIUM: CPT | Performed by: INTERNAL MEDICINE

## 2018-08-24 PROCEDURE — 82310 ASSAY OF CALCIUM: CPT | Performed by: INTERNAL MEDICINE

## 2018-08-24 PROCEDURE — 36415 COLL VENOUS BLD VENIPUNCTURE: CPT | Performed by: INTERNAL MEDICINE

## 2018-08-24 PROCEDURE — 84100 ASSAY OF PHOSPHORUS: CPT | Performed by: INTERNAL MEDICINE

## 2018-08-24 NOTE — LETTER
[address] [place] [address]    August 27, 2018    Priscilla Shaw  [address] [facility] [id number]          Dear Priscilla,    Normal electrolytes    Enclosed is a copy of your results.     Results for orders placed or performed in visit on 08/24/18   Calcium   Result Value Ref Range    Calcium 9.5 8.5 - 10.1 mg/dL   Magnesium   Result Value Ref Range    Magnesium 2.0 1.6 - 2.3 mg/dL   Phosphorus   Result Value Ref Range    Phosphorus 3.2 2.5 - 4.5 mg/dL       If you have any questions or concerns, please call myself or my nurse at [phone].      Sincerely,        Comfort Osborne MD /lloyd

## 2018-08-27 NOTE — TELEPHONE ENCOUNTER
Labs all okay.  No dental work or jaw work in the last month.  Patient scheduled for injection 8-30-18, at 10 a.m.      Message sent to EDVIN Dangelo (ancillary) to order medication.  Jessy Chow,

## 2018-08-30 ENCOUNTER — ALLIED HEALTH/NURSE VISIT (OUTPATIENT)
Dept: NURSING | Facility: CLINIC | Age: 83
End: 2018-08-30
Payer: COMMERCIAL

## 2018-08-30 DIAGNOSIS — N18.30 CKD (CHRONIC KIDNEY DISEASE) STAGE 3, GFR 30-59 ML/MIN (H): ICD-10-CM

## 2018-08-30 DIAGNOSIS — M81.0 OSTEOPOROSIS: Primary | ICD-10-CM

## 2018-08-30 PROCEDURE — 96372 THER/PROPH/DIAG INJ SC/IM: CPT

## 2018-08-30 NOTE — MR AVS SNAPSHOT
"              After Visit Summary   8/30/2018    Priscilla Shaw    MRN: [id number]           Patient Information     Date Of Birth          1935        Visit Information        Provider Department      8/30/2018 10:00 AM [facility]        Today's Diagnoses     Osteoporosis    -  1    CKD (chronic kidney disease) stage 3, GFR 30-59 ml/min          Care Instructions    You Received your Prolia injection today  Your next Injection is due in 6 months (around 2/28/19)  If you plan on having any dental work done within the next 6 months, please let your dentist know that you are on this medication.  We will call in advance to have your next injection scheduled.   Make sure you do not have any dental work completed involving the jaw bone within 1 month prior to your scheduled injection            Follow-ups after your visit        Follow-up notes from your care team     Return in about 6 months (around 2/28/2019).      Who to contact     If you have questions or need follow up information about today's clinic visit or your schedule please contact [facility] KATHARINA directly at [phone].  Normal or non-critical lab and imaging results will be communicated to you by [employer], letter or phone within 4 business days after the clinic has received the results. If you do not hear from us within 7 days, please contact the clinic through [employer] or phone. If you have a critical or abnormal lab result, we will notify you by phone as soon as possible.  Submit refill requests through [employer] or call your pharmacy and they will forward the refill request to us. Please allow 3 business days for your refill to be completed.          Additional Information About Your Visit        MyChart Information     [employer] lets you send messages to your doctor, view your test results, renew your prescriptions, schedule appointments and more. To sign up, go to [email or web address] . Click on \"Log in\" on the left " "side of the screen, which will take you to the Welcome page. Then click on \"Sign up Now\" on the right side of the page.     You will be asked to enter the access code listed below, as well as some personal information. Please follow the directions to create your username and password.     Your access code is: [email or web address]  Expires: 2018  7:47 AM     Your access code will  in 90 days. If you need help or a new code, please call your [facility] clinic or [phone].        Care EveryWhere ID     This is your Care EveryWhere ID. This could be used by other organizations to access your [facility] medical records  [id number]         Blood Pressure from Last 3 Encounters:   18 146/82   18 118/64   17 138/82    Weight from Last 3 Encounters:   18 147 lb (66.7 kg)   17 144 lb 3.2 oz (65.4 kg)   16 140 lb (63.5 kg)              Today, you had the following     No orders found for display       Primary Care Provider Office Phone # Fax #    Comfort Osborne [phone] [address] [facility] [address]        Equal Access to Services     BENITA PEREYRA AH: Hadii aad ku hadasho Soomaali, waaxda luqadaha, qaybta kaalmada adeegyada, raj loera haynadeenn sarah calle . So [facility] [phone].    ATENCIÓN: Si habla español, tiene a garcía disposición servicios gratuitos de asistencia lingüística. Llame al [phone].    We comply with applicable federal civil rights laws and [place] laws. We do not discriminate on the basis of race, color, national origin, age, disability, sex, sexual orientation, or gender identity.            Thank you!     Thank you for choosing [facility]  for your care. Our goal is always to provide you with excellent care. Hearing back from our patients is one way we can continue to improve our services. Please take a few minutes to complete the written survey that you may receive in the mail after your visit with us. " Thank you!             Your Updated Medication List - Protect others around you: Learn how to safely use, store and throw away your medicines at www.disposemymeds.org.          This list is accurate as of 8/30/18 10:28 AM.  Always use your most recent med list.                   Brand Name Dispense Instructions for use Diagnosis    amLODIPine 5 MG tablet    NORVASC    90 tablet    Take 1 tablet (5 mg) by mouth daily    Hypertension goal BP (blood pressure) < 140/90       aspirin 81 MG tablet      Take 1 tablet by mouth daily.        CENTRUM SILVER per tablet      Take 1 tablet by mouth daily. 1 tablet        cod liver oil Caps capsule      1 daily        denosumab 60 MG/ML Soln injection    PROLIA    1 mL    Inject 1 mL (60 mg) Subcutaneous every 6 months    Age-related osteoporosis without current pathological fracture, CKD (chronic kidney disease) stage 3, GFR 30-59 ml/min       magnesium chloride 535 (64 Mg) MG Tbec CR tablet      Take 535 mg by mouth daily        SUPER B COMPLEX Tabs      1 daily        VIACTIV 500-500-40 MG-UNT-MCG Chew   Generic drug:  calcium-vitamin D-vitamin K      1 daily        vitamin D 400 units tablet      1daily

## 2018-08-30 NOTE — TELEPHONE ENCOUNTER
Postponing this encounter-    Patient due for Prolia injection around 2/28/19    1. Please ask provider to put in a new order for Prolia injection if one is not current (there is a refill on the order from 8/13/18, no need for a renewal for the injection due in Feb 2019) and any labs orders if needed (it is up to the provider's discretion on how often labs are checked once Prolia injections have been started)    2. Please contact patient to schedule the injection with RN    3. Ask MA to re-order med 1 week in advance    Make sure patient has not had any dental work including the jaw bone (i.e teeth extraction) 1 month prior to injection  If insurance has changed or it is a new calendar year, will need [facility] pharmacist, Miri Sloan, to do insurance verification again prior to scheduling and ordering med    Mary Chávez RN

## 2018-08-30 NOTE — PROGRESS NOTES
Indication: Prolia  (denosumab) is a prescription medicine used to treat osteoporosis in patients who:     Are at high risk for fracture, meaning patients who have had a fracture related to osteoporosis, or who have multiple risk factors for fracture     Cannot use another osteoporosis medicine or other osteoporosis medicines did not work well   The timeline for early/late injections would be 4 weeks early and any time after the 6 month dottie. If a patient receives their injection late, then the subsequent injection would be 6 months from the date that they actually received the injection    1.  When was the last injection?  This is the initial injection  2.  Did they check with their insurance for this calendar year?  yes  3.  Is there an order in the chart?  Yes  4.  Has the patient had dental work involving the bone in the past month or will have work in the next 6 months?  No  5.  Did you have the patient wait 15 minutes after the injection?  Yes  6.  Remember to use .injection under the medication notes    The following steps were completed to comply with the REMS program for Prolia:    Reviewed information in the Medication Guide and Patient Counseling Chart, including the serious risks of Prolia  and the symptoms of each risk.    Advised patient to seek prompt medical attention if they have signs or symptoms of any of the serious risks.  Provided each patient a copy of the Medication Guide and Patient Brochure.    Mary Chávez RN

## 2018-08-30 NOTE — PATIENT INSTRUCTIONS
You Received your Prolia injection today  Your next Injection is due in 6 months (around 2/28/19)  If you plan on having any dental work done within the next 6 months, please let your dentist know that you are on this medication.  We will call in advance to have your next injection scheduled.   Make sure you do not have any dental work completed involving the jaw bone within 1 month prior to your scheduled injection

## 2019-02-11 ENCOUNTER — TELEPHONE (OUTPATIENT)
Dept: INTERNAL MEDICINE | Facility: CLINIC | Age: 84
End: 2019-02-11

## 2019-02-11 DIAGNOSIS — M81.0 AGE-RELATED OSTEOPOROSIS WITHOUT CURRENT PATHOLOGICAL FRACTURE: Primary | ICD-10-CM

## 2019-02-11 NOTE — TELEPHONE ENCOUNTER
Spoke to patient.  She said insurance has changed from [employer] to [employer] -736 (PPO). ID is [id number] [phone]).  Jessy Chow,

## 2019-02-11 NOTE — TELEPHONE ENCOUNTER
Team coordinators - please call pt to see if she has new insurance information (it looks like she hasn't been into clinic yet in 2019).    Thanks!  Miri Sloan, PharmD, [facility]  Medication Therapy Management Provider  Pager: [phone]

## 2019-02-11 NOTE — TELEPHONE ENCOUNTER
Thanks!  Submitted Prolia insurance verification request via [employer].  Please leave encounter open; will await response.    Miri Sloan PharmD, [facility]  Medication Therapy Management Provider  Pager: [phone]

## 2019-02-19 NOTE — TELEPHONE ENCOUNTER
Patient called and informed.  She is okay with getting injection.    Routing to Dr. Osborne to advise if any labs need to be done before scheduling injection. Jessy Chow,

## 2019-02-19 NOTE — TELEPHONE ENCOUNTER
Patient called.  Lab appointment scheduled for Tuesday, February 26th at 10 a.m.    Injection scheduled for March 1st, at 10 a.m.    If labs abnormal, will need to reschedule injection.  Patient is aware of this.    Patient has had no dental work, extractions, etc.    Message sent to EDVIN Dagnelo to order medication.  Jessy Chow,

## 2019-02-19 NOTE — TELEPHONE ENCOUNTER
Received notification that Prolia is subject to 20% co-insurance for cost and administration (roughly $200 per injection).  Routing to team coordinators to notify pt and schedule if she's interested.    Yeimi UriarteD, [facility]  Medication Therapy Management Provider  Pager: [phone]

## 2019-02-26 ENCOUNTER — ALLIED HEALTH/NURSE VISIT (OUTPATIENT)
Dept: FAMILY MEDICINE | Facility: CLINIC | Age: 84
End: 2019-02-26
Payer: COMMERCIAL

## 2019-02-26 VITALS — DIASTOLIC BLOOD PRESSURE: 88 MMHG | SYSTOLIC BLOOD PRESSURE: 136 MMHG

## 2019-02-26 DIAGNOSIS — I10 HYPERTENSION GOAL BP (BLOOD PRESSURE) < 140/90: Primary | ICD-10-CM

## 2019-02-26 DIAGNOSIS — M81.0 AGE-RELATED OSTEOPOROSIS WITHOUT CURRENT PATHOLOGICAL FRACTURE: ICD-10-CM

## 2019-02-26 LAB
CALCIUM SERPL-MCNC: 8.8 MG/DL (ref 8.5–10.1)
MAGNESIUM SERPL-MCNC: 1.9 MG/DL (ref 1.6–2.3)
PHOSPHATE SERPL-MCNC: 3 MG/DL (ref 2.5–4.5)

## 2019-02-26 PROCEDURE — 84100 ASSAY OF PHOSPHORUS: CPT | Performed by: INTERNAL MEDICINE

## 2019-02-26 PROCEDURE — 36415 COLL VENOUS BLD VENIPUNCTURE: CPT | Performed by: INTERNAL MEDICINE

## 2019-02-26 PROCEDURE — 99207 ZZC NO CHARGE NURSE ONLY: CPT | Performed by: INTERNAL MEDICINE

## 2019-02-26 PROCEDURE — 83735 ASSAY OF MAGNESIUM: CPT | Performed by: INTERNAL MEDICINE

## 2019-02-26 PROCEDURE — 82310 ASSAY OF CALCIUM: CPT | Performed by: INTERNAL MEDICINE

## 2019-02-26 NOTE — PROGRESS NOTES
Priscilla Shaw is enrolled/participating in the retail pharmacy Blood Pressure Goals Achievement Program (BPGAP).  Priscilla Shaw was evaluated at [facility] on February 26, 2019 at which time her blood pressure was:    BP Readings from Last 3 Encounters:   02/26/19 136/88   08/13/18 146/82   07/13/18 118/64     Reviewed lifestyle modifications for blood pressure control and reduction: including making healthy food choices, managing weight, getting regular exercise, smoking cessation, reducing alcohol consumption, monitoring blood pressure regularly.     Priscilla Shaw is not experiencing symptoms.    Follow-Up: BP is at goal of < 140/90mmHg (patient 18+ years of age with or without diabetes).  Recommended follow-up at pharmacy in 6 months.     Recommendation to Provider: None at this time.     Priscilla Shaw was evaluated for enrollment into the PGEN study today.    PLEASE INITIATE ENROLLMENT DISCUSSION WITH HTN PTS  1) Between 30-80 years old                                                                                                               2) BMI between 19-50                                                                                                        3) BP ?140/90 AND ?170/110 patients aged 30-59         BP ?150/90 AND ?170/110 non-diabetic patients aged 60-80       BP ?140/90 AND ?170/110 diabetic patients aged 60-80  4) Additional requirements for uncontrolled HTN patients:        Pt on only 1 class of medication  5) EXCLUDE patient if confirmation of:                  ? Cardiac disease                  ? Chronic Kidney Disease                  ? Pregnancy/Breastfeeding                  ? Secondary Hypertension/Pre-eclampsia                                 ? Vascular disease    Patient eligible for enrollment:  No  Patient interested in enrollment:  No    This note completed by: Thank you,  Zuleyka Henderson, PharmD  [facility]  [phone]

## 2019-02-26 NOTE — LETTER
[address] [place] [address]    February 27, 2019    Priscilla Shaw  [address] [facility] [id number]          Dear Priscilla,    Normal electrolytes    Enclosed is a copy of your results.     Results for orders placed or performed in visit on 02/26/19   Calcium   Result Value Ref Range    Calcium 8.8 8.5 - 10.1 mg/dL   Magnesium   Result Value Ref Range    Magnesium 1.9 1.6 - 2.3 mg/dL   Phosphorus   Result Value Ref Range    Phosphorus 3.0 2.5 - 4.5 mg/dL       If you have any questions or concerns, please call myself or my nurse at [phone].      Sincerely,        Comfort Osborne MD /lloyd

## 2019-03-01 ENCOUNTER — ALLIED HEALTH/NURSE VISIT (OUTPATIENT)
Dept: NURSING | Facility: CLINIC | Age: 84
End: 2019-03-01
Payer: COMMERCIAL

## 2019-03-01 DIAGNOSIS — M81.0 OSTEOPOROSIS: Primary | ICD-10-CM

## 2019-03-01 PROCEDURE — 96372 THER/PROPH/DIAG INJ SC/IM: CPT

## 2019-03-01 NOTE — TELEPHONE ENCOUNTER
Postponing this encounter-    Patient due for Prolia injection around 9/1/19    1. Please ask provider to put in a new order for Prolia injection if one is not current, and any lab orders if needed (it is up to the provider's discretion on how often labs are checked once Prolia injections have been started)    2. Please contact patient to schedule the injection with RN    3. Ask MA to re-order med 1 week in advance    Make sure patient has not had any dental work including the jaw bone (i.e teeth extraction) 1 month prior to injection  If insurance has changed or it is a new calendar year, will need [facility] pharmacist, Miri Sloan, to do insurance verification again prior to scheduling and ordering med    Mary Chávez RN

## 2019-03-01 NOTE — PATIENT INSTRUCTIONS
You Received your Prolia injection today  Your next Injection is due in 6 months (around 9/1/2019)  If you plan on having any dental work done within the next 6 months, please let your dentist know that you are on this medication.  We will call in advance to have your next injection scheduled.   Make sure you do not have any dental work completed involving the jaw bone within 1 month prior to your scheduled injection

## 2019-03-01 NOTE — PROGRESS NOTES
Prior to injection, verified patient identity using patient's name and date of birth.  Due to injection administration, patient instructed to remain in clinic for 15 minutes  afterwards, and to report any adverse reaction to me immediately.    Indication: Prolia  (denosumab) is a prescription medicine used to treat osteoporosis in patients who:   Are at high risk for fracture, meaning patients who have had a fracture related to osteoporosis, or who have multiple risk factors for fracture   Cannot use another osteoporosis medicine or other osteoporosis medicines did not work well   The timeline for early/late injections would be 4 weeks early and any time after the 6 month dottie. If a patient receives their injection late, then the subsequent injection would be 6 months from the date that they actually received the injection    1.  When was the last injection?  8/30/18  2.  Did they check with their insurance for this calendar year? Yes  3.  Is there an order in the chart?  Yes  4.  Has the patient had dental work involving the bone in the past month or will have work in the next 6 months?  No    The following steps were completed to comply with the REMS program for Prolia:  Reviewed information in the Medication Guide and Patient Counseling Chart, including the serious risks of Prolia  and the symptoms of each risk.  Advised patient to seek prompt medical attention if they have signs or symptoms of any of the serious risks.  Provided each patient a copy of the Medication Guide and Patient Brochure.      Drug Amount Wasted:  None.  Vial/Syringe: Syringe  Expiration Date:  5/21      Mary Chávez RN

## 2019-07-26 ENCOUNTER — DOCUMENTATION ONLY (OUTPATIENT)
Dept: LAB | Facility: CLINIC | Age: 84
End: 2019-07-26

## 2019-07-26 DIAGNOSIS — N18.30 CKD (CHRONIC KIDNEY DISEASE) STAGE 3, GFR 30-59 ML/MIN (H): Primary | ICD-10-CM

## 2019-08-09 DIAGNOSIS — N18.30 CKD (CHRONIC KIDNEY DISEASE) STAGE 3, GFR 30-59 ML/MIN (H): ICD-10-CM

## 2019-08-09 LAB
ANION GAP SERPL CALCULATED.3IONS-SCNC: 5 MMOL/L (ref 3–14)
BUN SERPL-MCNC: 16 MG/DL (ref 7–30)
CALCIUM SERPL-MCNC: 9.3 MG/DL (ref 8.5–10.1)
CHLORIDE SERPL-SCNC: 108 MMOL/L (ref 94–109)
CO2 SERPL-SCNC: 29 MMOL/L (ref 20–32)
CREAT SERPL-MCNC: 0.96 MG/DL (ref 0.52–1.04)
GFR SERPL CREATININE-BSD FRML MDRD: 55 ML/MIN/{1.73_M2}
GLUCOSE SERPL-MCNC: 93 MG/DL (ref 70–99)
MAGNESIUM SERPL-MCNC: 2 MG/DL (ref 1.6–2.3)
PHOSPHATE SERPL-MCNC: 3.5 MG/DL (ref 2.5–4.5)
POTASSIUM SERPL-SCNC: 4 MMOL/L (ref 3.4–5.3)
SODIUM SERPL-SCNC: 142 MMOL/L (ref 133–144)

## 2019-08-09 PROCEDURE — 83735 ASSAY OF MAGNESIUM: CPT | Performed by: INTERNAL MEDICINE

## 2019-08-09 PROCEDURE — 84100 ASSAY OF PHOSPHORUS: CPT | Performed by: INTERNAL MEDICINE

## 2019-08-09 PROCEDURE — 36415 COLL VENOUS BLD VENIPUNCTURE: CPT | Performed by: INTERNAL MEDICINE

## 2019-08-09 PROCEDURE — 80048 BASIC METABOLIC PNL TOTAL CA: CPT | Performed by: INTERNAL MEDICINE

## 2019-08-14 ENCOUNTER — TELEPHONE (OUTPATIENT)
Dept: INTERNAL MEDICINE | Facility: CLINIC | Age: 84
End: 2019-08-14

## 2019-08-14 DIAGNOSIS — N18.30 CKD (CHRONIC KIDNEY DISEASE) STAGE 3, GFR 30-59 ML/MIN (H): ICD-10-CM

## 2019-08-14 DIAGNOSIS — M81.0 AGE-RELATED OSTEOPOROSIS WITHOUT CURRENT PATHOLOGICAL FRACTURE: ICD-10-CM

## 2019-08-14 NOTE — TELEPHONE ENCOUNTER
Routing to Dr. Osborne to place new order for prolia injection and advise if any lab work is needed prior to injection. Jessy Chow,

## 2019-08-14 NOTE — TELEPHONE ENCOUNTER
Prolia is covered, pt may have a copay of ~$200.    Routing to Pink Team to schedule.    Chrissie Frederick, YeimiD  Medication Therapy Management Pharmacist  [phone]

## 2019-08-15 NOTE — TELEPHONE ENCOUNTER
Patient called and informed of $200 copay and patient is okay with this.    Patient has not had any dental work, extractions, or jaw bone work within the past month.    Appointment scheduled for Tuesday, September 3rd at 10 a.m.    Message sent to EDVIN Lucas to order medication. Jessy Chow,

## 2019-08-16 ENCOUNTER — OFFICE VISIT (OUTPATIENT)
Dept: INTERNAL MEDICINE | Facility: CLINIC | Age: 84
End: 2019-08-16
Payer: COMMERCIAL

## 2019-08-16 VITALS
HEIGHT: 60 IN | BODY MASS INDEX: 29.21 KG/M2 | DIASTOLIC BLOOD PRESSURE: 78 MMHG | WEIGHT: 148.8 LBS | HEART RATE: 76 BPM | SYSTOLIC BLOOD PRESSURE: 118 MMHG | RESPIRATION RATE: 14 BRPM | OXYGEN SATURATION: 98 % | TEMPERATURE: 97.8 F

## 2019-08-16 DIAGNOSIS — Z00.00 ENCOUNTER FOR MEDICARE ANNUAL WELLNESS EXAM: Primary | ICD-10-CM

## 2019-08-16 DIAGNOSIS — I10 HYPERTENSION GOAL BP (BLOOD PRESSURE) < 140/90: ICD-10-CM

## 2019-08-16 DIAGNOSIS — N18.30 CKD (CHRONIC KIDNEY DISEASE) STAGE 3, GFR 30-59 ML/MIN (H): ICD-10-CM

## 2019-08-16 PROCEDURE — 99207 C PAF COMPLETED  NO CHARGE: CPT | Mod: 25 | Performed by: INTERNAL MEDICINE

## 2019-08-16 PROCEDURE — 99397 PER PM REEVAL EST PAT 65+ YR: CPT | Performed by: INTERNAL MEDICINE

## 2019-08-16 RX ORDER — AMLODIPINE BESYLATE 5 MG/1
5 TABLET ORAL DAILY
Qty: 90 TABLET | Refills: 3 | Status: SHIPPED | OUTPATIENT
Start: 2019-08-16 | End: 2020-09-25

## 2019-08-16 ASSESSMENT — MIFFLIN-ST. JEOR: SCORE: 1055.2

## 2019-08-16 NOTE — PROGRESS NOTES
"  SUBJECTIVE:   Priscilla Shaw is a 84 year old female who presents for Preventive Visit.  Are you in the first 12 months of your Medicare Part B coverage?  No    Physical Health:    In general, how would you rate your overall physical health? good    Outside of work, how many days during the week do you exercise? 2-3 days/week    Outside of work, approximately how many minutes a day do you exercise?15-30 minutes    If you drink alcohol do you typically have >3 drinks per day or >7 drinks per week? No    Do you usually eat at least 4 servings of fruit and vegetables a day, include whole grains & fiber and avoid regularly eating high fat or \"junk\" foods? Yes    Do you have any problems taking medications regularly?  No    Do you have any side effects from medications? none    Needs assistance for the following daily activities: no assistance needed    Which of the following safety concerns are present in your home?  none identified     Hearing impairment: No    In the past 6 months, have you been bothered by leaking of urine? no    Mental Health:    In general, how would you rate your overall mental or emotional health? good  PHQ-2 Score:      Do you feel safe in your environment? Yes    Do you have a Health Care Directive? Yes: Patient states has Advance Directive and will bring in a copy to clinic.    Additional concerns to address?  No    Fall risk:  Fallen 2 or more times in the past year?: No  Any fall with injury in the past year?: No    Cognitive Screenin) Repeat 3 items (Leader, Season, Table)    2) Clock draw: NORMAL  3) 3 item recall: Recalls 1 object   Results: NORMAL clock, 1-2 items recalled: COGNITIVE IMPAIRMENT LESS LIKELY    Mini-CogTM Copyright CRISTINA Hernandez. Licensed by the author for use in [facility]; reprinted with permission (jasbir@.[email or web address]). All rights reserved.      Do you have sleep apnea, excessive snoring or daytime drowsiness?: yes, snoring     Patient Instructions from Last " visit 2018  Patient Instructions   The [facility] pharmacist will call you about the Prolia.  Obtain the Shingrix shingles vaccine.    HPI  Overall patient is doing well, patient notes no concerns. She wonders about new osteoporosis medications from an article on [employer]. She wonders how long she will be staying on Prolia- she is going on for her third. She doesn't check her blood pressure at home but her blood pressure today at her visitation is 118/78.    -------------------------------------    Reviewed and updated as needed this visit by clinical staff  Tobacco  Allergies  Meds  Problems  Med Hx  Surg Hx  Fam Hx  Soc Hx          Reviewed and updated as needed this visit by Provider  Tobacco  Allergies  Meds  Problems  Med Hx  Surg Hx  Fam Hx        Social History     Tobacco Use     Smoking status: Former Smoker     Types: Cigarettes     Last attempt to quit: 2009     Years since quittin.9     Smokeless tobacco: Never Used   Substance Use Topics     Alcohol use: Yes                           Current providers sharing in care for this patient include:  Patient Care Team:  Comfort Osborne MD as PCP - General  Comfort Osborne MD as Assigned PCP    The following health maintenance items are reviewed in Epic and correct as of today:  Health Maintenance   Topic Date Due     EYE EXAM  2017     MEDICARE ANNUAL WELLNESS VISIT  2019     INFLUENZA VACCINE (1) 2019     DEXA  2020     BMP  2020     FALL RISK ASSESSMENT  2020     ADVANCE CARE PLANNING  2021     DTAP/TDAP/TD IMMUNIZATION (3 - Td) 2023     LIPID  2023     COLONOSCOPY  2024     PHQ-2  Completed     PNEUMOCOCCAL IMMUNIZATION 65+ LOW/MEDIUM RISK  Completed     ZOSTER IMMUNIZATION  Completed     IPV IMMUNIZATION  Aged Out     MENINGITIS IMMUNIZATION  Aged Out     Patient Active Problem List   Diagnosis     CARDIOVASCULAR SCREENING; LDL GOAL LESS THAN 130     Hypertension  goal BP (blood pressure) < 140/90     Osteoporosis     Atrophy, vulva     Health Care Home     Advance care planning     Vertigo     Mastodynia     Cataract     CKD (chronic kidney disease) stage 3, GFR 30-59 ml/min (H)     Past Surgical History:   Procedure Laterality Date     APPENDECTOMY       HYSTERECTOMY, BERNADETTE         Social History     Tobacco Use     Smoking status: Former Smoker     Types: Cigarettes     Last attempt to quit: 2009     Years since quittin.9     Smokeless tobacco: Never Used   Substance Use Topics     Alcohol use: Yes     Family History   Problem Relation Age of Onset     Hypertension Mother      Heart Disease Sister         pacemaker      Hypertension Sister      Heart Disease Son      Heart Disease Sister      Osteoporosis Sister          Current Outpatient Medications   Medication Sig Dispense Refill     amLODIPine (NORVASC) 5 MG tablet Take 1 tablet (5 mg) by mouth daily 90 tablet 3     aspirin 81 MG tablet Take 1 tablet by mouth daily.       COD LIVER OIL OR CAPS 1 daily        denosumab (PROLIA) 60 MG/ML SOLN injection Inject 1 mL (60 mg) Subcutaneous every 6 months 1 mL 1     magnesium chloride 535 (64 Mg) MG TBEC CR tablet Take 535 mg by mouth daily       Multiple Vitamins-Minerals (CENTRUM SILVER) per tablet Take 1 tablet by mouth daily. 1 tablet       SUPER B COMPLEX OR TABS 1 daily        VIACTIV 500-500-40 MG-UNT-MCG OR CHEW 1 daily       VITAMIN D 400 UNIT OR TABS 1daily       denosumab (PROLIA) 60 MG/ML SOSY injection Inject 1 mL (60 mg) Subcutaneous every 6 months (Patient not taking: Reported on 2019) 1 mL 1     Allergies   Allergen Reactions     Fosamax [Alendronic Acid] Other (See Comments)     Stomach pain     ROS:  CONSTITUTIONAL: NEGATIVE for fever, chills, change in weight  INTEGUMENTARY/SKIN: NEGATIVE for worrisome rashes, moles or lesions  EYES: NEGATIVE for vision changes or irritation  ENT/MOUTH: NEGATIVE for ear, mouth and throat problems  RESP:  "NEGATIVE for significant cough or SOB  BREAST: NEGATIVE for masses, tenderness or discharge  CV: NEGATIVE for chest pain, palpitations or peripheral edema  GI: NEGATIVE for nausea, abdominal pain, heartburn, or change in bowel habits  : NEGATIVE for frequency, dysuria, or hematuria  MUSCULOSKELETAL: NEGATIVE for significant arthralgias or myalgia  NEURO: NEGATIVE for weakness, dizziness or paresthesias  ENDOCRINE: NEGATIVE for temperature intolerance, skin/hair changes  HEME: NEGATIVE for bleeding problems  PSYCHIATRIC: NEGATIVE for changes in mood or affect    This document serves as a record of the services and decisions personally performed and made by Comfort Osborne MD. It was created on her behalf by Gladis Wise, a trained medical scribe. The creation of this document is based on the provider's statements to the medical scribe.  Gladis Wise 8:19 AM August 16, 2019    OBJECTIVE:   /78   Pulse 76   Temp 97.8  F (36.6  C) (Oral)   Resp 14   Ht 1.522 m (4' 11.92\")   Wt 67.5 kg (148 lb 12.8 oz)   SpO2 98%   BMI 29.14 kg/m   Estimated body mass index is 29.14 kg/m  as calculated from the following:    Height as of this encounter: 1.522 m (4' 11.92\").    Weight as of this encounter: 67.5 kg (148 lb 12.8 oz).  EXAM:   GENERAL APPEARANCE: healthy, alert and no distress  EYES: Eyes grossly normal to inspection, PERRL and conjunctivae and sclerae normal  HENT: ear canals and TM's normal, nose and mouth without ulcers or lesions, oropharynx clear and oral mucous membranes moist  NECK: no adenopathy, no asymmetry, masses, or scars and thyroid normal to palpation  RESP: lungs clear to auscultation - no rales, rhonchi or wheezes  CV: regular rate and rhythm, normal S1 S2, no S3 or S4, no murmur, click or rub, no peripheral edema and peripheral pulses strong  ABDOMEN: soft, nontender, no hepatosplenomegaly, no masses and bowel sounds normal  MS: no musculoskeletal defects are noted and gait is age appropriate without " "ataxia  SKIN: no suspicious lesions or rashes  NEURO: Normal strength and tone, sensory exam grossly normal, mentation intact and speech normal  PSYCH: mentation appears normal and affect normal/bright      Diagnostic Test Results:  Labs reviewed in Epic  No results found for this or any previous visit (from the past 24 hour(s)).    ASSESSMENT / PLAN:   1. Encounter for Medicare annual wellness exam  Routine general exam was administered today. See counseling messages below.    2. CKD (chronic kidney disease) stage 3, GFR 30-59 ml/min (H)  Controlled well with current measures.  Will continue to monitor for further conditions.    3. Hypertension goal BP (blood pressure) < 140/90  Well controlled with current measures.  Blood pressure today was 118/78.  Refills placed today.  Will continue to monitor for further conditions.  - amLODIPine (NORVASC) 5 MG tablet; Take 1 tablet (5 mg) by mouth daily  Dispense: 90 tablet; Refill: 3      Patient Instructions     Bone density in 1/2020    Patient Education   Personalized Prevention Plan  You are due for the preventive services outlined below.  Your care team is available to assist you in scheduling these services.  If you have already completed any of these items, please share that information with your care team to update in your medical record.  Health Maintenance Due   Topic Date Due     Eye Exam  07/29/2017     PHQ-2  01/01/2019     FALL RISK ASSESSMENT  08/13/2019     Annual Wellness Visit  08/13/2019          End of Life Planning:  Patient currently has an advanced directive: Yes.  Practitioner is supportive of decision.    COUNSELING:  Reviewed preventive health counseling, as reflected in patient instructions       Regular exercise       Healthy diet/nutrition       Vision screening       Fall risk prevention       Advanced Planning     Estimated body mass index is 29.14 kg/m  as calculated from the following:    Height as of this encounter: 1.522 m (4' 11.92\").    " Weight as of this encounter: 67.5 kg (148 lb 12.8 oz).         reports that she quit smoking about 9 years ago. Her smoking use included cigarettes. She has never used smokeless tobacco.      Appropriate preventive services were discussed with this patient, including applicable screening as appropriate for cardiovascular disease, diabetes, osteopenia/osteoporosis, and glaucoma.  As appropriate for age/gender, discussed screening for colorectal cancer, prostate cancer, breast cancer, and cervical cancer. Checklist reviewing preventive services available has been given to the patient.    Reviewed patients plan of care and provided an AVS. The Basic Care Plan (routine screening as documented in Health Maintenance) for Priscilla meets the Care Plan requirement. This Care Plan has been established and reviewed with the Patient.    Counseling Resources:  ATP IV Guidelines  Pooled Cohorts Equation Calculator  Breast Cancer Risk Calculator  FRAX Risk Assessment  ICSI Preventive Guidelines  Dietary Guidelines for Americans, 2010  USDA's MyPlate  ASA Prophylaxis  Lung CA Screening    The information in this document, created by the medical scribe for me, accurately reflects the services I personally performed and the decisions made by me. I have reviewed and approved this document for accuracy prior to leaving the patient care area.  August 16, 2019 8:21 AM    Comfort Osborne MD  [facility]

## 2019-08-16 NOTE — PATIENT INSTRUCTIONS
Bone density in 1/2020    Patient Education   Personalized Prevention Plan  You are due for the preventive services outlined below.  Your care team is available to assist you in scheduling these services.  If you have already completed any of these items, please share that information with your care team to update in your medical record.  Health Maintenance Due   Topic Date Due     Eye Exam  07/29/2017     PHQ-2  01/01/2019     FALL RISK ASSESSMENT  08/13/2019     Annual Wellness Visit  08/13/2019

## 2019-09-03 ENCOUNTER — TELEPHONE (OUTPATIENT)
Dept: INTERNAL MEDICINE | Facility: CLINIC | Age: 84
End: 2019-09-03

## 2019-09-03 ENCOUNTER — ALLIED HEALTH/NURSE VISIT (OUTPATIENT)
Dept: NURSING | Facility: CLINIC | Age: 84
End: 2019-09-03
Payer: COMMERCIAL

## 2019-09-03 DIAGNOSIS — M81.0 OSTEOPOROSIS: Primary | ICD-10-CM

## 2019-09-03 DIAGNOSIS — M81.0 AGE-RELATED OSTEOPOROSIS WITHOUT CURRENT PATHOLOGICAL FRACTURE: Primary | ICD-10-CM

## 2019-09-03 PROCEDURE — 96372 THER/PROPH/DIAG INJ SC/IM: CPT

## 2019-09-03 NOTE — PROGRESS NOTES
Clinic Administered Medication Documentation    MEDICATION LIST:   Prolia Documentation    Prior to injection, verified patient identity using patient's name and date of birth. Medication was administered. Please see MAR and medication order for additional information. Patient instructed to remain in clinic for 15 minutes.    Indication: Prolia  (denosumab) is a prescription medicine used to treat osteoporosis in patients who:     Are at high risk for fracture, meaning patients who have had a fracture related to osteoporosis, or who have multiple risk factors for fracture.    Cannot use another osteoporosis medicine or other osteoporosis medicines did not work well.    The timeline for early/late injections would be 4 weeks early and any time after the 6 month dottie. If a patient receives their injection late, then the subsequent injection would be 6 months from the date that they actually received the injection.    1.  When was the last injection?  3/1/19  2.  Did they check with their insurance for this calendar year?  yes  3.  Is there an order in the chart?  yes  4.  Has the patient had dental work involving the bone in the past month or will have work in the next 6 months?  No    The following steps were completed to comply with the REMS program for Prolia:    Reviewed information in the Medication Guide and Patient Counseling Chart, including the serious risks of Prolia  and the symptoms of each risk.    Advised patient to seek prompt medical attention if they have signs or symptoms of any of the serious risks.    Provided each patient a copy of the Medication Guide and Patient Brochure.    Was entire vial of medication used? Yes  Vial/Syringe: Single dose vial  Expiration Date:  10/21  No CAM order in chart, noted 2 other normal orders for Prolia- discontinued old order and added CAM order to match current order.   Deana Gleason RN

## 2019-09-03 NOTE — TELEPHONE ENCOUNTER
Postponing this encounter-    Patient due for Prolia injection around early March 2020    1. Please ask provider to put in a new order for Prolia injection if one is not current, and any lab orders if needed (it is up to the provider's discretion on how often labs are checked once Prolia injections have been started)    2. Please contact patient to schedule the injection with RN    3. Ask MA to re-order med 1 week in advance    Make sure patient has not had any dental work including the jaw bone (i.e teeth extraction) 1 month prior to injection  If insurance has changed or it is a new calendar year, will need [facility] pharmacist, Chrissie Frederick, to do insurance verification again prior to scheduling and ordering med    Deana Gleason RN

## 2019-09-03 NOTE — PATIENT INSTRUCTIONS
You Received your Prolia injection today  Your next Injection is due in 6 months (around March 2020)  If you plan on having any dental work done within the next 6 months, please let your dentist know that you are on this medication.  We will call in advance to have your next injection scheduled.   Make sure you do not have any dental work completed involving the jaw bone within 1 month prior to your scheduled injection

## 2019-10-03 ENCOUNTER — ALLIED HEALTH/NURSE VISIT (OUTPATIENT)
Dept: NURSING | Facility: CLINIC | Age: 84
End: 2019-10-03
Payer: COMMERCIAL

## 2019-10-03 DIAGNOSIS — Z23 NEED FOR PROPHYLACTIC VACCINATION AND INOCULATION AGAINST INFLUENZA: Primary | ICD-10-CM

## 2019-10-03 PROCEDURE — G0008 ADMIN INFLUENZA VIRUS VAC: HCPCS

## 2019-10-03 PROCEDURE — 99207 ZZC NO CHARGE NURSE ONLY: CPT

## 2019-10-03 PROCEDURE — 90662 IIV NO PRSV INCREASED AG IM: CPT

## 2020-02-11 ENCOUNTER — TELEPHONE (OUTPATIENT)
Dept: INTERNAL MEDICINE | Facility: CLINIC | Age: 85
End: 2020-02-11

## 2020-02-11 DIAGNOSIS — M81.0 AGE-RELATED OSTEOPOROSIS WITHOUT CURRENT PATHOLOGICAL FRACTURE: Primary | ICD-10-CM

## 2020-02-11 NOTE — TELEPHONE ENCOUNTER
Huddled with Dr. Osborne. Ok to place dexa order. Order placed. Called and left detailed message for pt requesting she call imaging line to schedule, call back to the RN hotline if any further questions or concerns.    Iris Mcguire RN  [facility]

## 2020-02-11 NOTE — TELEPHONE ENCOUNTER
Reason for call:  Other   Patient called regarding (reason for call): call back  Additional comments: Patient is calling wanting a dexa bone density order put in. Please call back when it is in so she can make an appt    Phone number to reach patient:  Home number on file [phone] (home)    Best Time:  any    Can we leave a detailed message on this number?  YES

## 2020-02-11 NOTE — TELEPHONE ENCOUNTER
Okay for RN to place order for Dexa? Last done 01/26/2018. Reason: Age-related osteoporosis without current pathological fracture [M81.0]. Recommendation was: Follow up can be considered in 2 years.

## 2020-02-21 ENCOUNTER — ANCILLARY PROCEDURE (OUTPATIENT)
Dept: BONE DENSITY | Facility: CLINIC | Age: 85
End: 2020-02-21
Attending: INTERNAL MEDICINE
Payer: COMMERCIAL

## 2020-02-21 DIAGNOSIS — M81.0 AGE-RELATED OSTEOPOROSIS WITHOUT CURRENT PATHOLOGICAL FRACTURE: ICD-10-CM

## 2020-02-21 PROCEDURE — 77085 DXA BONE DENSITY AXL VRT FX: CPT | Performed by: INTERNAL MEDICINE

## 2020-02-21 NOTE — LETTER
[address] [place] [address]    2020    Priscilla Shaw  [address] [facility] [address] [facility] [address]          Dear Priscilla,    You have had significant improvement of your bone density in your back and hips.  This is wonderful news!     Enclosed is a copy of your results.     Results for orders placed or performed in visit on 20   DX Hip/Pelvis/Spine w Lat Fraction Blank     Status: None    Narrative    BONE DENSITOMETRY  [facility]  [address] [place] [address]  [place] [id number]  2020      PATIENT: Priscilla Shaw  CHART: [id number]   :  1935  AGE:  84 year old  SEX:  female   REFERRING PROVIDER:  Comfort Osborne MD     PROCEDURE:  Bone density scanning was performed using DXA technology of   the lumbar spine and hip.  Scanning was performed on a Lunar Prodigy   scanner.  Reporting is completed in the form of a T-score.  The T-score   represents the standard deviation from peak bone mass based on a young   healthy adult.     REFERENCE T-SCORES:       Normal                -1.0 and greater                                 Osteopenia         Between -1.0 and -2.5                                             Osteoporosis     -2.5 and less                                       RISK FACTORS:  Post-menopausal, Height loss of 1.5 inches, Parent history   of osteoporosis with a hip fracture,  Fracture of wrist, follow up   osteopenia    CURRENT TREATMENT:  Calcium, Prolia (denosumab), Vitamin D     FINDINGS:               Lumbar Spine (L1-L2)      T-score:  -2.0, marked degenerative   changes present at L3,4, so only L1,2 are evaluated.                Left Femoral Neck            T-score:  -1.9               Right Femoral Neck          T-score:  -2.2                              Lumbar (L1-L2) BMD: 0.928  Previous: 0.879                          Total Hip Mean BMD: 0.788  Previous:  "0.746     Comparison is made to another DXA performed on the same Lunar Prodigy    machine on 1/26/2018.      LATERAL VERTEBRAL ASSESSMENT  Procedure:  Vertebral fracture assessment was performed in the lateral   decubitus position using a Lunar Prodigy  densitometer.  Indications for VFA: T-score of -1.0 or worse, age (female>69) and height   loss > 1.5\"  Confounding factors for VFA: None.  The LVA scan is interpretable from T7   to L5.    VFA Findings: Using the semi-quantitative analysis of Darrin there was   evidence of no spinal deformity  VFA Impression: Priscilla Shaw has no vertebral fractures identified on   the VFA.       IMPRESSION  Osteopenia.    There has been significant increase in bone density of the lumbar spine.   There has been significant increase in bone density of the hip(s).    Recommendations include ensuring adequate Calcium and Vitamin D.    Follow up can be considered in 3 years.   ___________________  Zuleyka Soler M.D.  Electronically signed           If you have any questions or concerns, please call myself or my nurse at [phone].      Sincerely,        Comfort Osborne MD /lloyd  "

## 2020-02-24 NOTE — RESULT ENCOUNTER NOTE
You have had significant improvement of your bone density in your back and hips.  This is wonderful news!

## 2020-02-26 NOTE — TELEPHONE ENCOUNTER
Insurance verification request for Prolia sent. Awaiting response. (for diagnosis of Osteoporosis, per 2015 DEXA)    Thanks!  Chrissie Frederick, PharmD  Medication Therapy Management Pharmacist  [phone]

## 2020-03-02 NOTE — TELEPHONE ENCOUNTER
I called patient and informed her of message regarding insurance not covering the prolia injection.  Patient said she doesn't understand why it wouldn't be covered since she thinks its the same insurance as when she had her last injection.  She will call her insurance company to see what they say and call me back to let me know. Jessy Chow,

## 2020-03-02 NOTE — TELEPHONE ENCOUNTER
Patient called her insurance ([employer]) and the rep told her this was covered.  Provider services number to call [phone].      Will route back to [facility] to verify.  Jessy Chow,

## 2020-03-02 NOTE — TELEPHONE ENCOUNTER
Prolia is not covered by patient's insurance.    Chrissie Frederick, PharmD  Medication Therapy Management Pharmacist  [phone]

## 2020-03-02 NOTE — TELEPHONE ENCOUNTER
My guess is it is patient would have to pay out of pocket (~$2000), otherwise, another option would be to see if her pharmacy benefit covers Prolia - could try sending Rx to [facility].    Chrissie Frederick, PharmD  Medication Therapy Management Pharmacist  [phone]'

## 2020-03-03 NOTE — TELEPHONE ENCOUNTER
[employer] assist states Dr. Osborne is out of network and Priscilla's insurance doesn't have coverage for out of network providers. I am having [employer] Assist reverify this.      Chrissie Frederick, PharmD  Medication Therapy Management Pharmacist  [phone]

## 2020-03-06 NOTE — TELEPHONE ENCOUNTER
Patient called to check status.  She told me that she talked with Maurice from [employer] today who told her that this was covered and that Dr. Osborne is in network.  His phone number is [phone].  Jessy Chow,

## 2020-03-09 NOTE — TELEPHONE ENCOUNTER
Routing to Dr. Osborne to place order for prolia if needed and advise if any labs are needed prior to injection. Jessy Chow,

## 2020-03-09 NOTE — TELEPHONE ENCOUNTER
I heard back from [employer], they verified yes, Priscilla has coverage for [employer] and Dr. Osborne is in network.  Pt may have ~$300 copay.    Routing to PATI Porras to schedule.    Thanks!  Chrissie Frederick, PharmD  Medication Therapy Management Pharmacist  [phone]

## 2020-03-10 ENCOUNTER — TELEPHONE (OUTPATIENT)
Dept: FAMILY MEDICINE | Facility: CLINIC | Age: 85
End: 2020-03-10

## 2020-03-10 NOTE — TELEPHONE ENCOUNTER
Called and spoke with Priscilla. Checked. Patient has not had dental work done.    Gave her the message about out of pocket cost  Pt may have ~$300 copay    Patient understood. She is scheduled.  Next 5 appointments (look out 90 days)    Mar 19, 2020 10:30 AM CDT  Nurse Only with PATI RN  [facility] ([facility]) [address] [facility] [id number] [phone]          Sent email to order Prolia medication for injection appointment. Beverley Feliz,     ABDIRASHID'stefan Rogers in the email request

## 2020-03-10 NOTE — TELEPHONE ENCOUNTER
Reason for call:  Other   Patient called regarding (reason for call): call back  Additional comments: Patient is calling because she is scheduled for a Prolia shot on 3/19/20 and is wondering if it is ok to have a dental appt prior for a teeth cleaning. Please call back       Phone number to reach patient:  Home number on file [phone] (home)    Best Time:  any    Can we leave a detailed message on this number?  YES    Travel screening: Not Applicable

## 2020-03-10 NOTE — TELEPHONE ENCOUNTER
Called and spoke with patient. Advised her a basic teeth cleaning is fine but if having any dental work, including extractions or repairs, she should reschedule. Advised her to notify her dentist she is on this medication as well. Verbalized understanding & no further questions.     Debo Mari RN

## 2020-03-19 ENCOUNTER — ALLIED HEALTH/NURSE VISIT (OUTPATIENT)
Dept: NURSING | Facility: CLINIC | Age: 85
End: 2020-03-19
Payer: COMMERCIAL

## 2020-03-19 DIAGNOSIS — M81.0 OSTEOPOROSIS: Primary | ICD-10-CM

## 2020-03-19 PROCEDURE — 96372 THER/PROPH/DIAG INJ SC/IM: CPT

## 2020-03-19 NOTE — PROGRESS NOTES
Clinic Administered Medication Documentation      Prolia Documentation    Prior to injection, verified patient identity using patient's name and date of birth. Medication was administered. Please see MAR and medication order for additional information. Patient instructed to remain in clinic for 15 minutes and report any adverse reaction to staff immediately .    Indication: Prolia  (denosumab) is a prescription medicine used to treat osteoporosis in patients who:     Are at high risk for fracture, meaning patients who have had a fracture related to osteoporosis, or who have multiple risk factors for fracture.    Cannot use another osteoporosis medicine or other osteoporosis medicines did not work well.    The timeline for early/late injections would be 4 weeks early and any time after the 6 month dottie. If a patient receives their injection late, then the subsequent injection would be 6 months from the date that they actually received the injection.    1.  When was the last injection?  9/3/19  2.  Did they check with their insurance for this calendar year?  Yes  3.  Is there an order in the chart?  yes  4.  Has the patient had dental work involving the bone in the past month or will have work in the next 6 months?  No    The following steps were completed to comply with the REMS program for Prolia:    Reviewed information in the Medication Guide and Patient Counseling Chart, including the serious risks of Prolia  and the symptoms of each risk.    Advised patient to seek prompt medical attention if they have signs or symptoms of any of the serious risks.    Provided each patient a copy of the Medication Guide and Patient Brochure.    Was entire vial of medication used? Yes  Vial/Syringe: Single dose vial  Expiration Date:  02/2022  Was this medication supplied by the patient? No     FYI- when signing ABN patient checked option 2, advised that option 1 would bill insurance with estimated $300 charge, otherwise checking  other options patient may pay full price for injection around $2000, patient confirmed she wanted to check option 2.     Deana Gleason RN

## 2020-06-29 ENCOUNTER — TELEPHONE (OUTPATIENT)
Dept: FAMILY MEDICINE | Facility: CLINIC | Age: 85
End: 2020-06-29

## 2020-06-29 NOTE — TELEPHONE ENCOUNTER
Please review care everywhere results and advise. Thanks.    Iris Mcguire RN  [facility]

## 2020-06-29 NOTE — TELEPHONE ENCOUNTER
Patient called and informed.  Patient said she had a chest x-ray, echocardiogram, etc., and would like to know the results.  Are we able to look them up on her chart and give these to her?  Jessy Chow,

## 2020-06-29 NOTE — TELEPHONE ENCOUNTER
Yes, Dr. Osborne can see these in Care Everywhere. Your results were essentially normal. Follow-up with Dr. Osborne if you are having symptoms.     Fannie Nye MD

## 2020-06-29 NOTE — TELEPHONE ENCOUNTER
Please notify patient that her primary care provider should have access to these records through care everywhere.

## 2020-06-29 NOTE — TELEPHONE ENCOUNTER
Reason for call:  Other   Patient called regarding (reason for call): call back  Additional comments: Patient is calling because she was seen at [facility] in [place] and they ran labs and did xrays and she she wants to discuss getting those transferred to her primary doctor. Please call back     Phone number to reach patient:  Home number on file [phone] (home)    Best Time:  any    Can we leave a detailed message on this number?  YES    Travel screening: Not Applicable

## 2020-06-30 NOTE — TELEPHONE ENCOUNTER
Spoke to patient and she wants a copy of her results. Please send copy to patient's home  Janeth Wise CMA on 6/30/2020 at 8:14 AM

## 2020-06-30 NOTE — TELEPHONE ENCOUNTER
Patient called and informed we are not able to print records from CareEverywhere.  Informed her I will mail her a release form to sign and have her send it back to us.  I will fax the DENISE to them to have records sent to her home. Jessy Chow,

## 2020-08-31 ENCOUNTER — TRANSFERRED RECORDS (OUTPATIENT)
Dept: HEALTH INFORMATION MANAGEMENT | Facility: CLINIC | Age: 85
End: 2020-08-31

## 2020-08-31 ENCOUNTER — MEDICAL CORRESPONDENCE (OUTPATIENT)
Dept: HEALTH INFORMATION MANAGEMENT | Facility: CLINIC | Age: 85
End: 2020-08-31

## 2020-10-02 ENCOUNTER — DOCUMENTATION ONLY (OUTPATIENT)
Dept: LAB | Facility: CLINIC | Age: 85
End: 2020-10-02

## 2020-10-02 DIAGNOSIS — N18.31 STAGE 3A CHRONIC KIDNEY DISEASE (H): Primary | ICD-10-CM

## 2020-10-02 DIAGNOSIS — M81.0 AGE-RELATED OSTEOPOROSIS WITHOUT CURRENT PATHOLOGICAL FRACTURE: ICD-10-CM

## 2020-10-08 DIAGNOSIS — M81.0 AGE-RELATED OSTEOPOROSIS WITHOUT CURRENT PATHOLOGICAL FRACTURE: ICD-10-CM

## 2020-10-08 DIAGNOSIS — N18.31 STAGE 3A CHRONIC KIDNEY DISEASE (H): ICD-10-CM

## 2020-10-08 LAB
ANION GAP SERPL CALCULATED.3IONS-SCNC: 7 MMOL/L (ref 3–14)
BUN SERPL-MCNC: 17 MG/DL (ref 7–30)
CALCIUM SERPL-MCNC: 9.2 MG/DL (ref 8.5–10.1)
CHLORIDE SERPL-SCNC: 107 MMOL/L (ref 94–109)
CO2 SERPL-SCNC: 28 MMOL/L (ref 20–32)
CREAT SERPL-MCNC: 0.92 MG/DL (ref 0.52–1.04)
GFR SERPL CREATININE-BSD FRML MDRD: 58 ML/MIN/{1.73_M2}
GLUCOSE SERPL-MCNC: 96 MG/DL (ref 70–99)
MAGNESIUM SERPL-MCNC: 2.2 MG/DL (ref 1.6–2.3)
PHOSPHATE SERPL-MCNC: 3.2 MG/DL (ref 2.5–4.5)
POTASSIUM SERPL-SCNC: 4.1 MMOL/L (ref 3.4–5.3)
SODIUM SERPL-SCNC: 142 MMOL/L (ref 133–144)

## 2020-10-08 PROCEDURE — 80048 BASIC METABOLIC PNL TOTAL CA: CPT | Performed by: INTERNAL MEDICINE

## 2020-10-08 PROCEDURE — 83735 ASSAY OF MAGNESIUM: CPT | Performed by: INTERNAL MEDICINE

## 2020-10-08 PROCEDURE — 84100 ASSAY OF PHOSPHORUS: CPT | Performed by: INTERNAL MEDICINE

## 2020-10-14 ASSESSMENT — ENCOUNTER SYMPTOMS
COUGH: 0
JOINT SWELLING: 0
DIARRHEA: 0
PARESTHESIAS: 0
FREQUENCY: 0
ABDOMINAL PAIN: 0
HEADACHES: 0
CONSTIPATION: 0
HEMATOCHEZIA: 0
ARTHRALGIAS: 0
CHILLS: 0
SORE THROAT: 0
DIZZINESS: 0
WEAKNESS: 0
EYE PAIN: 0
HEARTBURN: 0
HEMATURIA: 0
SHORTNESS OF BREATH: 0
NAUSEA: 0
FEVER: 0
MYALGIAS: 0
BREAST MASS: 0
DYSURIA: 0
PALPITATIONS: 0
NERVOUS/ANXIOUS: 0

## 2020-10-14 ASSESSMENT — ACTIVITIES OF DAILY LIVING (ADL): CURRENT_FUNCTION: NO ASSISTANCE NEEDED

## 2020-10-15 ENCOUNTER — OFFICE VISIT (OUTPATIENT)
Dept: INTERNAL MEDICINE | Facility: CLINIC | Age: 85
End: 2020-10-15
Payer: COMMERCIAL

## 2020-10-15 VITALS
DIASTOLIC BLOOD PRESSURE: 70 MMHG | RESPIRATION RATE: 20 BRPM | HEIGHT: 60 IN | BODY MASS INDEX: 27.29 KG/M2 | TEMPERATURE: 98 F | OXYGEN SATURATION: 97 % | HEART RATE: 82 BPM | WEIGHT: 139 LBS | SYSTOLIC BLOOD PRESSURE: 126 MMHG

## 2020-10-15 DIAGNOSIS — Z23 NEED FOR PROPHYLACTIC VACCINATION AND INOCULATION AGAINST INFLUENZA: ICD-10-CM

## 2020-10-15 DIAGNOSIS — N18.31 STAGE 3A CHRONIC KIDNEY DISEASE (H): ICD-10-CM

## 2020-10-15 DIAGNOSIS — I10 HYPERTENSION GOAL BP (BLOOD PRESSURE) < 140/90: ICD-10-CM

## 2020-10-15 DIAGNOSIS — M81.0 AGE-RELATED OSTEOPOROSIS WITHOUT CURRENT PATHOLOGICAL FRACTURE: ICD-10-CM

## 2020-10-15 DIAGNOSIS — Z00.00 MEDICARE ANNUAL WELLNESS VISIT, SUBSEQUENT: Primary | ICD-10-CM

## 2020-10-15 PROCEDURE — G0008 ADMIN INFLUENZA VIRUS VAC: HCPCS | Performed by: INTERNAL MEDICINE

## 2020-10-15 PROCEDURE — 99397 PER PM REEVAL EST PAT 65+ YR: CPT | Mod: 25 | Performed by: INTERNAL MEDICINE

## 2020-10-15 PROCEDURE — 90662 IIV NO PRSV INCREASED AG IM: CPT | Performed by: INTERNAL MEDICINE

## 2020-10-15 RX ORDER — AMLODIPINE BESYLATE 5 MG/1
5 TABLET ORAL DAILY
Qty: 90 TABLET | Refills: 3 | Status: SHIPPED | OUTPATIENT
Start: 2020-10-15 | End: 2020-10-27

## 2020-10-15 ASSESSMENT — ENCOUNTER SYMPTOMS
BREAST MASS: 0
PARESTHESIAS: 0
EYE PAIN: 0
NAUSEA: 0
MYALGIAS: 0
PALPITATIONS: 0
CHILLS: 0
HEADACHES: 0
WEAKNESS: 0
DYSURIA: 0
HEMATOCHEZIA: 0
CONSTIPATION: 0
ABDOMINAL PAIN: 0
DIARRHEA: 0
NERVOUS/ANXIOUS: 0
HEARTBURN: 0
SORE THROAT: 0
DIZZINESS: 0
FEVER: 0
FREQUENCY: 0
SHORTNESS OF BREATH: 0
JOINT SWELLING: 0
ARTHRALGIAS: 0
HEMATURIA: 0
COUGH: 0

## 2020-10-15 ASSESSMENT — MIFFLIN-ST. JEOR: SCORE: 1003.25

## 2020-10-15 ASSESSMENT — ACTIVITIES OF DAILY LIVING (ADL): CURRENT_FUNCTION: NO ASSISTANCE NEEDED

## 2020-10-15 NOTE — PATIENT INSTRUCTIONS
PROLIA  Risk Evaluation and Mitigation Strategy Best Practice Advisory    In May 2015, the FDA required an update to the PROLIA  (denosumab) REMS (Risk Evaluation and Mitigation Strategy) to inform both providers and patients about potential serious risks related to PROLIA .    These potential serious risks include:    Hypocalcemia    Osteonecrosis of the jaw    Atypical femoral fractures    Serious Infections    Dermatologic reactions    To ensure compliance with these requirements [facility] has developed a workflow for those patients who will receive PROLIA  at clinic.  This workflow includes insurance verification, patient scheduling, patient education, and documentation. Registered Nurses in the clinic should have completed eLearning related to the REMS and the clinic workflow.  Refer to them to initiate this process.  This workflow does not need to be executed if the patient is to receive PROLIA  injection at [facility].       The  has put together a Patient Education Toolkit.  Copies of these handouts may be available your clinic. Patients must receive the Patient Brochure and Medication Guide when receiving injection at clinic.  These will be attached to the injection ordered from [facility] [employer] to the clinic. Links to handouts:  Patient Counseling Chart for Healthcare Providers  Patient Brochure  Prescribing Information  Medication Guide    If you are having trouble accessing the above links, these documents can be found at: [email or web address]    The role of healthcare provider includes reviewing patient education materials with the patient, advising patients to seek medical attention if they have signs or symptoms of one of the serious risks, and provide patients a copy of the Patient Brochure and Medication Guide when receiving their injection.      Due to the risk of hypocalcemia the Prescribing  Information for PROLIA  recommends that calcium and mineral levels (magnesium and phosphorus) be checked within 14 days of injection for those predisposed to hypocalcemia.

## 2020-10-15 NOTE — PROGRESS NOTES
"SUBJECTIVE:   Priscilla Shaw is a 85 year old female who presents for Preventive Visit.      Patient has been advised of split billing requirements and indicates understanding: Yes   Are you in the first 12 months of your Medicare coverage?  No  SHE HAS DROPPED HER SNACKS, WINE.  SHE IS lifting weights    Healthy Habits:     In general, how would you rate your overall health?  Excellent    Frequency of exercise:  2-3 days/week    Duration of exercise:  N/A    Do you usually eat at least 4 servings of fruit and vegetables a day, include whole grains    & fiber and avoid regularly eating high fat or \"junk\" foods?  Yes    Taking medications regularly:  Yes    Medication side effects:  Muscle aches    Ability to successfully perform activities of daily living:  No assistance needed    Home Safety:  Lack of grab bars in the bathroom    Hearing Impairment:  No hearing concerns    In the past 6 months, have you been bothered by leaking of urine?  No    In general, how would you rate your overall mental or emotional health?  Excellent      PHQ-2 Total Score: 0    Additional concerns today:  Yes    Do you feel safe in your environment? Yes    Have you ever done Advance Care Planning? (For example, a Health Directive, POLST, or a discussion with a medical provider or your loved ones about your wishes): Yes, advance care planning is on file.      Fall risk  Fallen 2 or more times in the past year?: No  Any fall with injury in the past year?: No    Cognitive Screening   1) Repeat 3 items (Leader, Season, Table)    2) Clock draw: NORMAL  3) 3 item recall: Recalls 3 objects  Results: 3 items recalled: COGNITIVE IMPAIRMENT LESS LIKELY    Mini-CogTM Copyright CRISTINA Hernandez. Licensed by the author for use in [facility]; reprinted with permission (jasbir@.[email or web address]). All rights reserved.      Do you have sleep apnea, excessive snoring or daytime drowsiness?: no    Reviewed and updated as needed this visit by clinical " staff  Tobacco  Allergies  Meds   Med Hx  Surg Hx  Fam Hx  Soc Hx        Reviewed and updated as needed this visit by Provider                Social History     Tobacco Use     Smoking status: Former Smoker     Packs/day: 0.00     Years: 50.00     Pack years: 0.00     Types: Cigarettes     Start date: 7/15/1957     Quit date: 2009     Years since quittin.0     Smokeless tobacco: Never Used     Tobacco comment: I have no current problems due to smoking   Substance Use Topics     Alcohol use: Yes     Comment: 1 glass of wine per day     If you drink alcohol do you typically have >3 drinks per day or >7 drinks per week? No    Alcohol Use 10/14/2020   Prescreen: >3 drinks/day or >7 drinks/week? No   Prescreen: >3 drinks/day or >7 drinks/week? -           -------------------------------------    Current providers sharing in care for this patient include:   Patient Care Team:  Comfort Osborne MD as PCP - General  Comfort Osborne MD as Assigned PCP    The following health maintenance items are reviewed in Epic and correct as of today:  Health Maintenance   Topic Date Due     EYE EXAM  2017     FALL RISK ASSESSMENT  2020     INFLUENZA VACCINE (1) 2020     BMP  10/08/2021     MEDICARE ANNUAL WELLNESS VISIT  10/15/2021     ADVANCE CARE PLANNING  2021     DEXA  2022     DTAP/TDAP/TD IMMUNIZATION (3 - Td) 2023     LIPID  2023     COLORECTAL CANCER SCREENING  2024     PHQ-2  Completed     Pneumococcal Vaccine: 65+ Years  Completed     ZOSTER IMMUNIZATION  Completed     Pneumococcal Vaccine: Pediatrics (0 to 5 Years) and At-Risk Patients (6 to 64 Years)  Aged Out     IPV IMMUNIZATION  Aged Out     MENINGITIS IMMUNIZATION  Aged Out     HEPATITIS B IMMUNIZATION  Aged Out     Labs reviewed in EPIC      Review of Systems   Constitutional: Negative for chills and fever.   HENT: Negative for congestion, ear pain, hearing loss and sore throat.    Eyes: Negative for pain  "and visual disturbance.   Respiratory: Negative for cough and shortness of breath.    Cardiovascular: Negative for chest pain, palpitations and peripheral edema.   Gastrointestinal: Negative for abdominal pain, constipation, diarrhea, heartburn, hematochezia and nausea.   Breasts:  Negative for tenderness, breast mass and discharge.   Genitourinary: Negative for dysuria, frequency, genital sores, hematuria, pelvic pain, urgency, vaginal bleeding and vaginal discharge.   Musculoskeletal: Negative for arthralgias, joint swelling and myalgias.   Skin: Negative for rash.   Neurological: Negative for dizziness, weakness, headaches and paresthesias.   Psychiatric/Behavioral: Negative for mood changes. The patient is not nervous/anxious.          OBJECTIVE:   /70   Pulse 82   Temp 98  F (36.7  C) (Oral)   Resp 20   Ht 1.518 m (4' 11.76\")   Wt 63 kg (139 lb)   SpO2 97%   BMI 27.36 kg/m   Estimated body mass index is 27.36 kg/m  as calculated from the following:    Height as of this encounter: 1.518 m (4' 11.76\").    Weight as of this encounter: 63 kg (139 lb).  Physical Exam  GENERAL APPEARANCE: healthy, alert and no distress  EYES: Eyes grossly normal to inspection, PERRL and conjunctivae and sclerae normal  HENT: ear canals and TM's normal and nose and mouth without ulcers or lesions  NECK: no adenopathy, no asymmetry, masses, or scars and thyroid normal to palpation  RESP: lungs clear to auscultation - no rales, rhonchi or wheezes  BREAST: normal without masses, tenderness or nipple discharge and no palpable axillary masses or adenopathy  CV: regular rates and rhythm and normal S1 S2, no S3 or S4  LYMPHATICS: normal ant/post cervical and supraclavicular nodes  ABDOMEN: soft, nontender, without hepatosplenomegaly or masses and bowel sounds jacey  MS: extremities normal- no gross deformities noted  SKIN: no suspicious lesions or rashes  NEURO: Normal strength and tone, mentation intact and speech " "normal  PSYCH: mentation appears normal and affect normal/bright  No edema   1+ posterior tibial pulses bilateral        Diagnostic Test Results:  Labs reviewed in Epic    ASSESSMENT / PLAN:   1. Medicare annual wellness visit, subsequent      2. Age-related osteoporosis without current pathological fracture  Well controlled with medications without side effects. Discussed with patient risks and benefits   - denosumab (PROLIA) 60 MG/ML SOSY injection; Inject 1 mL (60 mg) Subcutaneous every 6 months  Dispense: 1 mL; Refill: 1    3. Stage 3a chronic kidney disease  Stable.      4. Need for prophylactic vaccination and inoculation against influenza    - FLUZONE HIGH DOSE 65+  [45220]  - ADMIN INFLUENZA (For MEDICARE Patients ONLY) []    5. Hypertension goal BP (blood pressure) < 140/90  Well controlled with medications without side effects.   - amLODIPine (NORVASC) 5 MG tablet; Take 1 tablet (5 mg) by mouth daily  Dispense: 90 tablet; Refill: 3    Patient has been advised of split billing requirements and indicates understanding: Yes  COUNSELING:  Reviewed preventive health counseling, as reflected in patient instructions    Estimated body mass index is 27.36 kg/m  as calculated from the following:    Height as of this encounter: 1.518 m (4' 11.76\").    Weight as of this encounter: 63 kg (139 lb).        She reports that she quit smoking about 11 years ago. Her smoking use included cigarettes. She started smoking about 63 years ago. She smoked 0.00 packs per day for 50.00 years. She has never used smokeless tobacco.      Appropriate preventive services were discussed with this patient, including applicable screening as appropriate for cardiovascular disease, diabetes, osteopenia/osteoporosis, and glaucoma.  As appropriate for age/gender, discussed screening for colorectal cancer, prostate cancer, breast cancer, and cervical cancer. Checklist reviewing preventive services available has been given to the " patient.    Reviewed patients plan of care and provided an AVS. The Basic Care Plan (routine screening as documented in Health Maintenance) for Priscilla meets the Care Plan requirement. This Care Plan has been established and reviewed with the Patient.    Counseling Resources:  ATP IV Guidelines  Pooled Cohorts Equation Calculator  Breast Cancer Risk Calculator  Breast Cancer: Medication to Reduce Risk  FRAX Risk Assessment  ICSI Preventive Guidelines  Dietary Guidelines for Americans, 2010  USDA's MyPlate  ASA Prophylaxis  Lung CA Screening    Comfort Osborne MD  [facility]    Identified Health Risks:

## 2020-10-16 ENCOUNTER — TELEPHONE (OUTPATIENT)
Dept: INTERNAL MEDICINE | Facility: CLINIC | Age: 85
End: 2020-10-16

## 2020-10-16 DIAGNOSIS — M81.0 AGE-RELATED OSTEOPOROSIS WITHOUT CURRENT PATHOLOGICAL FRACTURE: Primary | ICD-10-CM

## 2020-10-16 DIAGNOSIS — I10 HYPERTENSION GOAL BP (BLOOD PRESSURE) < 140/90: ICD-10-CM

## 2020-10-16 DIAGNOSIS — N18.30 CKD (CHRONIC KIDNEY DISEASE) STAGE 3, GFR 30-59 ML/MIN (H): ICD-10-CM

## 2020-10-16 NOTE — TELEPHONE ENCOUNTER
2. Age-related osteoporosis without current pathological fracture  Well controlled with medications without side effects. Discussed with patient risks and benefits   - denosumab (PROLIA) 60 MG/ML SOSY injection; Inject 1 mL (60 mg) Subcutaneous every 6 months  Dispense: 1 mL; Refill: 1    Comfort Osborne MD

## 2020-10-19 NOTE — TELEPHONE ENCOUNTER
Dx code added    From: Comfort Osborne MD   Sent: 10/19/2020  11:04 AM CDT   To: Izabel Pokl   Subject: RE: Prolia                                       You can add N18.3   ----- Message -----   From: Jam, Izabel DAVENPORT   Sent: 10/19/2020  10:52 AM CDT   To: Comfort Osborne MD   Subject: Prolia                                           Hello Dr. Osborne,       This patient is scheduled for prolia.  With their insurance coverage, it follows Medicare's NGS policy.  Currently we have the DX M81.0 but this request will need an additional diagnosis to support why this patient is not appropriate for other therapies.  Below is a list of secondary diagnosis that would follow Medicare's policy.     Z92.29 - Personal history of other drug therapy       T50.995S - Adverse effect of other drugs, medicaments and biological substances, sequela   T88.7XXS - Unspecified adverse effect of drug or medicament, sequela   Z88.8 - Allergy status to other drugs, medicaments and biological substances status     N18.3 - Chronic kidney disease, stage 3 (moderate)   N18.4 - Chronic kidney disease, stage 4 (severe)   N18.5 - Chronic kidney disease, stage 5     Would an additional diagnosis code apply to this patient? If so, can you please have this added to the CAM order as the secondary diagnosis?  Please let me know when this is completed and I can proceed with verifying patient's benefit for this medication.     Thank you,     Izabel Polk

## 2020-10-19 NOTE — TELEPHONE ENCOUNTER
LM for patient to call and schedule injection.  Need to make sure patient has not had any dental work, including extractions or any jaw bone surgery within the past month.  Jessy Chow,

## 2020-10-21 NOTE — TELEPHONE ENCOUNTER
Spoke to patient.  She has not had any dental work, extractions or jaw bone surgery within the last month.    Prolia injection scheduled for Tuesday, October 27th at 10:30 a.m.    Message sent to Landy LAFLEUR) to order medication.  Jessy Chow,

## 2020-10-27 ENCOUNTER — ALLIED HEALTH/NURSE VISIT (OUTPATIENT)
Dept: NURSING | Facility: CLINIC | Age: 85
End: 2020-10-27
Payer: COMMERCIAL

## 2020-10-27 DIAGNOSIS — N18.30 CKD (CHRONIC KIDNEY DISEASE) STAGE 3, GFR 30-59 ML/MIN (H): Primary | ICD-10-CM

## 2020-10-27 DIAGNOSIS — M81.0 OSTEOPOROSIS: ICD-10-CM

## 2020-10-27 PROCEDURE — 96372 THER/PROPH/DIAG INJ SC/IM: CPT

## 2020-10-27 RX ORDER — AMLODIPINE BESYLATE 5 MG/1
5 TABLET ORAL DAILY
Qty: 90 TABLET | Refills: 3 | Status: SHIPPED | OUTPATIENT
Start: 2020-10-27 | End: 2022-01-04

## 2020-10-27 NOTE — PATIENT INSTRUCTIONS
You received your Prolia injection today  Your next Injection is due in 6 months (around 04/27/2021)  If you plan on having any dental work done within the next 6 months, please let your dentist know that you are on this medication.  We will call in advance to have your next injection scheduled.   Make sure you do not have any dental work completed involving the jaw bone within 1 month prior to your scheduled injection    We will call you to schedule your next injection appointment in April 2021

## 2020-10-27 NOTE — PROGRESS NOTES
Clinic Administered Medication Documentation      Prolia Documentation    Prior to injection, verified patient identity using patient's name and date of birth. Medication was administered. Please see MAR and medication order for additional information. Patient instructed to remain in clinic for 15 minutes.    Indication: Prolia  (denosumab) is a prescription medicine used to treat osteoporosis in patients who:     Are at high risk for fracture, meaning patients who have had a fracture related to osteoporosis, or who have multiple risk factors for fracture.    Cannot use another osteoporosis medicine or other osteoporosis medicines did not work well.    The timeline for early/late injections would be 4 weeks early and any time after the 6 month dottie. If a patient receives their injection late, then the subsequent injection would be 6 months from the date that they actually received the injection.    1.  When was the last injection?  03/19/2020  2.  Did they check with their insurance for this calendar year?  yes  3.  Is there an order in the chart?  yes  4.  Has the patient had dental work involving the bone in the past month or will have work in the next 6 months?  no    The following steps were completed to comply with the REMS program for Prolia:    Reviewed information in the Medication Guide and Patient Counseling Chart, including the serious risks of Prolia  and the symptoms of each risk.    Advised patient to seek prompt medical attention if they have signs or symptoms of any of the serious risks.    Provided each patient a copy of the Medication Guide and Patient Brochure.    Was entire vial of medication used? Yes  Vial/Syringe: Single dose vial  Expiration Date:  10/31/2022  Was this medication supplied by the patient? No     Vianney Gramajo RN

## 2020-11-24 ENCOUNTER — TELEPHONE (OUTPATIENT)
Dept: INTERNAL MEDICINE | Facility: CLINIC | Age: 85
End: 2020-11-24

## 2020-11-24 DIAGNOSIS — N18.30 STAGE 3 CHRONIC KIDNEY DISEASE, UNSPECIFIED WHETHER STAGE 3A OR 3B CKD (H): ICD-10-CM

## 2020-11-24 DIAGNOSIS — M81.0 AGE-RELATED OSTEOPOROSIS WITHOUT CURRENT PATHOLOGICAL FRACTURE: Primary | ICD-10-CM

## 2020-11-24 NOTE — TELEPHONE ENCOUNTER
Postponing this encounter-    Patient due for Prolia injection around 04/27/2021    1. Please ask provider to put in a new CAM order for (Pre Auth) Prolia injection if one is not current and any lab orders if needed (it is up to the provider's discretion on how often labs are checked once Prolia injections have been started)    2. Please contact patient to schedule the injection with RN 2 weeks out    3. Ask MA to re-order med 1 week in advance    Make sure patient has not had any dental work including the jaw bone (i.e teeth extraction) 1 month prior to injection    For questions about the cost, patient may contact our CONSUMER PRICE LINE at [phone] for an estimate.     NO ABN IS NEEDED UNLESS COVERAGE CANNOT BE GUARENTEED (OFF LABEL USE, PA STILL IN PROCESS, INSURANCE PENDING, ETC)    CHECK STATUS PRIOR TO ADMINISTRATION- Chart Review > Referrals tab - Select the Referral to view the report    Vianney Gramajo RN

## 2021-02-16 ENCOUNTER — OFFICE VISIT (OUTPATIENT)
Dept: INTERNAL MEDICINE | Facility: CLINIC | Age: 86
End: 2021-02-16
Payer: COMMERCIAL

## 2021-02-16 VITALS
BODY MASS INDEX: 26.5 KG/M2 | HEART RATE: 81 BPM | OXYGEN SATURATION: 100 % | RESPIRATION RATE: 16 BRPM | WEIGHT: 134.6 LBS | TEMPERATURE: 97.8 F

## 2021-02-16 DIAGNOSIS — M81.0 AGE-RELATED OSTEOPOROSIS WITHOUT CURRENT PATHOLOGICAL FRACTURE: Primary | ICD-10-CM

## 2021-02-16 DIAGNOSIS — I10 HYPERTENSION GOAL BP (BLOOD PRESSURE) < 140/90: ICD-10-CM

## 2021-02-16 PROCEDURE — 99213 OFFICE O/P EST LOW 20 MIN: CPT | Performed by: INTERNAL MEDICINE

## 2021-02-16 NOTE — PATIENT INSTRUCTIONS
We are working hard to begin vaccinating more people against COVID-19. Currently, we are only vaccinating individuals age 75 and older and Phase 1a workers - healthcare workers who are unable to do their job remotely. Vaccine availability is very limited.    If you are 75 or older, or a healthcare worker who is unable to do your job remotely, please log in to [email or web address] using this link to see if we have an open appointment and schedule an appointment.  If there are no appointments left, you will be unable to schedule and need to check back later.  If you are a healthcare worker, you will be asked to provide proof of employment at your appointment. If you cannot, you will be turned away.    Vaccine appointments are being added as they become available. Please check your [email or web address] account frequently for availability. If you have technical difficulty using [email or web address], call [phone] for assistance.    You can learn more about the [facility]'s phased approach to administering the vaccine, with details on each phase, [email or web address].      Aa vaccine supply increases and we are able to open appointments to more groups, we will share that information on our website [email or web address]. Check this website to stay up to date on COVID-19 vaccination information.

## 2021-02-16 NOTE — PROGRESS NOTES
Assessment & Plan     Age-related osteoporosis without current pathological fracture  She will schedule prolia    Hypertension goal BP (blood pressure) < 140/90   controlled.     She is working to get covid shot.  Discussed MyChart surveillance.          15 minutes spent on the date of the encounter doing chart review, review of test results, patient visit and documentation              Return in about 8 months (around 10/16/2021) for Physical Exam.    Laura Anders MD  [facility] KATHARINA Wells is a 85 year old who presents for the following health issues     HPI      86 y/o F here to transfer care, her PCP is leaving the clinic.      H/o osteoporosis (Prolia, due 4/2021 ), HTN, CKD 3a,        Patient here to establish care and meet new provider since Dr. Osborne is current PCP.  Patient would like to discuss COVID 19 vaccine  New Patient/Transfer of Care    Review of Systems   Constitutional, HEENT, cardiovascular, pulmonary, gi and gu systems are negative, except as otherwise noted.      Objective    Pulse 81   Temp 97.8  F (36.6  C) (Oral)   Resp 16   Wt 61.1 kg (134 lb 9.6 oz)   SpO2 100%   BMI 26.50 kg/m    Body mass index is 26.5 kg/m .  Physical Exam   GENERAL: healthy, alert and no distress   Appears 20 years younger than chronological age.   EYES: Eyes grossly normal to inspection, PERRL and conjunctivae and sclerae normal  MS: no gross musculoskeletal defects noted, no edema  SKIN: no suspicious lesions or rashes  NEURO: Normal strength and tone, mentation intact and speech normal

## 2021-02-23 ENCOUNTER — IMMUNIZATION (OUTPATIENT)
Dept: NURSING | Facility: CLINIC | Age: 86
End: 2021-02-23
Payer: COMMERCIAL

## 2021-02-23 PROCEDURE — 91300 PR COVID VAC PFIZER DIL RECON 30 MCG/0.3 ML IM: CPT

## 2021-02-23 PROCEDURE — 0001A PR COVID VAC PFIZER DIL RECON 30 MCG/0.3 ML IM: CPT

## 2021-03-16 ENCOUNTER — IMMUNIZATION (OUTPATIENT)
Dept: NURSING | Facility: CLINIC | Age: 86
End: 2021-03-16
Attending: FAMILY MEDICINE
Payer: COMMERCIAL

## 2021-03-16 PROCEDURE — 0002A PR COVID VAC PFIZER DIL RECON 30 MCG/0.3 ML IM: CPT

## 2021-03-16 PROCEDURE — 91300 PR COVID VAC PFIZER DIL RECON 30 MCG/0.3 ML IM: CPT

## 2021-04-12 NOTE — PATIENT INSTRUCTIONS
PROLIA  Risk Evaluation and Mitigation Strategy Best Practice Advisory    In May 2015, the FDA required an update to the PROLIA  (denosumab) REMS (Risk Evaluation and Mitigation Strategy) to inform both providers and patients about potential serious risks related to PROLIA .    These potential serious risks include:    Hypocalcemia    Osteonecrosis of the jaw    Atypical femoral fractures    Serious Infections    Dermatologic reactions    To ensure compliance with these requirements [facility] has developed a workflow for those patients who will receive PROLIA  at clinic.  This workflow includes insurance verification, patient scheduling, patient education, and documentation. Registered Nurses in the clinic should have completed eLearning related to the REMS and the clinic workflow.  Refer to them to initiate this process.  This workflow does not need to be executed if the patient is to receive PROLIA  injection at [facility].       The  has put together a Patient Education Toolkit.  Copies of these handouts may be available your clinic. Patients must receive the Patient Brochure and Medication Guide when receiving injection at clinic.  These will be attached to the injection ordered from [facility] to the clinic. Links to handouts:  Patient Counseling Chart for Healthcare Providers  Patient Brochure  Prescribing Information  Medication Guide    If you are having trouble accessing the above links, these documents can be found at: [email or web address]    The role of healthcare provider includes reviewing patient education materials with the patient, advising patients to seek medical attention if they have signs or symptoms of one of the serious risks, and provide patients a copy of the Patient Brochure and Medication Guide when receiving their injection.      Due to the risk of  hypocalcemia the Prescribing Information for PROLIA  recommends that calcium and mineral levels (magnesium and phosphorus) be checked within 14 days of injection for those predisposed to hypocalcemia.

## 2021-04-12 NOTE — TELEPHONE ENCOUNTER
Routing encounter to Dr. Anders (covering pool) to place new CAM order for prolia and advise if any lab work is needed prior to injection.  Jessy Chow,

## 2021-04-13 NOTE — TELEPHONE ENCOUNTER
Patient called.  No dental work or extractions or jaw bone surgery within the past month.    Prolia injection scheduled for Wednesday, April 28th at 10 a.m.    Message sent to Landy LAFLEUR) to order medication.  Jessy Chow,

## 2021-04-21 NOTE — PROGRESS NOTES
Normal Vitamin D. Normal electrolytes. Normal kidney function. Good cholesterol. Normal thyroid.
Previsit lab to be discussed in clinic.
none

## 2021-04-28 ENCOUNTER — ALLIED HEALTH/NURSE VISIT (OUTPATIENT)
Dept: NURSING | Facility: CLINIC | Age: 86
End: 2021-04-28
Payer: COMMERCIAL

## 2021-04-28 DIAGNOSIS — M81.0 AGE-RELATED OSTEOPOROSIS WITHOUT CURRENT PATHOLOGICAL FRACTURE: Primary | ICD-10-CM

## 2021-04-28 PROCEDURE — 96372 THER/PROPH/DIAG INJ SC/IM: CPT

## 2021-04-28 NOTE — NURSING NOTE
Clinic Administered Medication Documentation      Prolia Documentation    Prior to injection, verified patient identity using patient's name and date of birth. Medication was administered. Please see MAR and medication order for additional information. Patient instructed to remain in clinic for 15 minutes and report any adverse reaction to staff immediately .    Indication: Prolia  (denosumab) is a prescription medicine used to treat osteoporosis in patients who:     Are at high risk for fracture, meaning patients who have had a fracture related to osteoporosis, or who have multiple risk factors for fracture.    Cannot use another osteoporosis medicine or other osteoporosis medicines did not work well.    The timeline for early/late injections would be 4 weeks early and any time after the 6 month dottie. If a patient receives their injection late, then the subsequent injection would be 6 months from the date that they actually received the injection.    1.  When was the last injection?  10/27/20  2.  Did they check with their insurance for this calendar year?  Yes  3.  Is there an order in the chart?  Yes  4.  Has the patient had dental work involving the bone in the past month or will have work in the next 6 months?  No. Pt states she had 3 dental implants 2 years ago and has cleanings 3 times per year    The following steps were completed to comply with the REMS program for Prolia:    Reviewed information in the Medication Guide and Patient Counseling Chart, including the serious risks of Prolia  and the symptoms of each risk.    Advised patient to seek prompt medical attention if they have signs or symptoms of any of the serious risks.    Provided each patient a copy of the Medication Guide and Patient Brochure.    Was entire vial of medication used? Yes  Vial/Syringe: Syringe  Expiration Date:  05/2023  Was this medication supplied by the patient? No

## 2021-04-28 NOTE — PATIENT INSTRUCTIONS
You received your Prolia injection today  Your next Injection is due in 6 months (around 10/28/2021)  If you plan on having any dental work done within the next 6 months, please let your dentist know that you are on this medication.  We will call in advance to have your next injection scheduled.   Make sure you do not have any dental work completed involving the jaw bone within 1 month prior to your scheduled injection

## 2021-04-28 NOTE — TELEPHONE ENCOUNTER
Postponing this encounter-    Patient due for Prolia injection around 10/28/2021    1. Please ask provider to put in a new CAM order for (Pre Auth) Prolia injection if one is not current and any lab orders if needed (it is up to the provider's discretion on how often labs are checked once Prolia injections have been started)    2. Please contact patient to schedule the injection with RN 2 weeks out    3. Ask MA to re-order med 1 week in advance    Make sure patient has NOT had any dental work involving the jaw bone (i.e teeth extraction) 1 month prior to injection     For questions about the cost, patient may contact our CONSUMER PRICE LINE at [phone] for an estimate.     NO ABN IS NEEDED UNLESS COVERAGE CANNOT BE GUARENTEED (OFF LABEL USE, PA STILL IN PROCESS, INSURANCE PENDING, ETC)    CHECK STATUS PRIOR TO ADMINISTRATION- Chart Review > Referrals tab - Select the Referral to view the report    Iris Mcguire RN  [facility]

## 2021-09-26 ENCOUNTER — HEALTH MAINTENANCE LETTER (OUTPATIENT)
Age: 86
End: 2021-09-26

## 2021-10-06 ENCOUNTER — DOCUMENTATION ONLY (OUTPATIENT)
Dept: LAB | Facility: CLINIC | Age: 86
End: 2021-10-06

## 2021-10-06 DIAGNOSIS — Z79.82 ASPIRIN LONG-TERM USE: ICD-10-CM

## 2021-10-06 DIAGNOSIS — I10 HYPERTENSION GOAL BP (BLOOD PRESSURE) < 140/90: Primary | ICD-10-CM

## 2021-10-06 DIAGNOSIS — Z13.6 CARDIOVASCULAR SCREENING; LDL GOAL LESS THAN 130: ICD-10-CM

## 2021-10-07 ENCOUNTER — TELEPHONE (OUTPATIENT)
Dept: INTERNAL MEDICINE | Facility: CLINIC | Age: 86
End: 2021-10-07

## 2021-10-07 DIAGNOSIS — M81.0 AGE-RELATED OSTEOPOROSIS WITHOUT CURRENT PATHOLOGICAL FRACTURE: Primary | ICD-10-CM

## 2021-10-07 DIAGNOSIS — N18.31 STAGE 3A CHRONIC KIDNEY DISEASE (H): ICD-10-CM

## 2021-10-12 ENCOUNTER — LAB (OUTPATIENT)
Dept: LAB | Facility: CLINIC | Age: 86
End: 2021-10-12
Payer: COMMERCIAL

## 2021-10-12 DIAGNOSIS — Z13.6 CARDIOVASCULAR SCREENING; LDL GOAL LESS THAN 130: ICD-10-CM

## 2021-10-12 DIAGNOSIS — I10 HYPERTENSION GOAL BP (BLOOD PRESSURE) < 140/90: ICD-10-CM

## 2021-10-12 DIAGNOSIS — Z79.82 ASPIRIN LONG-TERM USE: ICD-10-CM

## 2021-10-12 LAB
ANION GAP SERPL CALCULATED.3IONS-SCNC: 2 MMOL/L (ref 3–14)
BUN SERPL-MCNC: 17 MG/DL (ref 7–30)
CALCIUM SERPL-MCNC: 9.1 MG/DL (ref 8.5–10.1)
CHLORIDE BLD-SCNC: 108 MMOL/L (ref 94–109)
CHOLEST SERPL-MCNC: 185 MG/DL
CO2 SERPL-SCNC: 31 MMOL/L (ref 20–32)
CREAT SERPL-MCNC: 1.05 MG/DL (ref 0.52–1.04)
ERYTHROCYTE [DISTWIDTH] IN BLOOD BY AUTOMATED COUNT: 13.3 % (ref 10–15)
FASTING STATUS PATIENT QL REPORTED: YES
GFR SERPL CREATININE-BSD FRML MDRD: 49 ML/MIN/1.73M2
GLUCOSE BLD-MCNC: 93 MG/DL (ref 70–99)
HCT VFR BLD AUTO: 40.6 % (ref 35–47)
HDLC SERPL-MCNC: 76 MG/DL
HGB BLD-MCNC: 13.2 G/DL (ref 11.7–15.7)
LDLC SERPL CALC-MCNC: 81 MG/DL
MCH RBC QN AUTO: 32 PG (ref 26.5–33)
MCHC RBC AUTO-ENTMCNC: 32.5 G/DL (ref 31.5–36.5)
MCV RBC AUTO: 98 FL (ref 78–100)
NONHDLC SERPL-MCNC: 109 MG/DL
PLATELET # BLD AUTO: 183 10E3/UL (ref 150–450)
POTASSIUM BLD-SCNC: 4.2 MMOL/L (ref 3.4–5.3)
RBC # BLD AUTO: 4.13 10E6/UL (ref 3.8–5.2)
SODIUM SERPL-SCNC: 141 MMOL/L (ref 133–144)
TRIGL SERPL-MCNC: 141 MG/DL
WBC # BLD AUTO: 5.8 10E3/UL (ref 4–11)

## 2021-10-12 PROCEDURE — 80061 LIPID PANEL: CPT

## 2021-10-12 PROCEDURE — 36415 COLL VENOUS BLD VENIPUNCTURE: CPT

## 2021-10-12 PROCEDURE — 80048 BASIC METABOLIC PNL TOTAL CA: CPT

## 2021-10-12 PROCEDURE — 85027 COMPLETE CBC AUTOMATED: CPT

## 2021-10-13 NOTE — RESULT ENCOUNTER NOTE
Priscilla Shaw    Blood count, electrolytes, kidney function all look normal. Cholesterol looks terrific.     Sincerely,       VICKIE CRUZ M.D.

## 2021-10-15 PROBLEM — N18.31 STAGE 3A CHRONIC KIDNEY DISEASE (H): Status: ACTIVE | Noted: 2021-10-15

## 2021-10-15 RX ORDER — DIPHENHYDRAMINE HYDROCHLORIDE 50 MG/ML
50 INJECTION INTRAMUSCULAR; INTRAVENOUS
Status: CANCELLED
Start: 2021-10-15

## 2021-10-15 RX ORDER — MEPERIDINE HYDROCHLORIDE 25 MG/ML
25 INJECTION INTRAMUSCULAR; INTRAVENOUS; SUBCUTANEOUS EVERY 30 MIN PRN
Status: CANCELLED | OUTPATIENT
Start: 2021-10-15

## 2021-10-15 RX ORDER — ALBUTEROL SULFATE 90 UG/1
1-2 AEROSOL, METERED RESPIRATORY (INHALATION)
Status: CANCELLED
Start: 2021-10-15

## 2021-10-15 RX ORDER — EPINEPHRINE 1 MG/ML
0.3 INJECTION, SOLUTION, CONCENTRATE INTRAVENOUS EVERY 5 MIN PRN
Status: CANCELLED | OUTPATIENT
Start: 2021-10-15

## 2021-10-15 RX ORDER — NALOXONE HYDROCHLORIDE 0.4 MG/ML
0.2 INJECTION, SOLUTION INTRAMUSCULAR; INTRAVENOUS; SUBCUTANEOUS
Status: CANCELLED | OUTPATIENT
Start: 2021-10-15

## 2021-10-15 RX ORDER — ALBUTEROL SULFATE 0.83 MG/ML
2.5 SOLUTION RESPIRATORY (INHALATION)
Status: CANCELLED | OUTPATIENT
Start: 2021-10-15

## 2021-10-15 RX ORDER — METHYLPREDNISOLONE SODIUM SUCCINATE 125 MG/2ML
125 INJECTION, POWDER, LYOPHILIZED, FOR SOLUTION INTRAMUSCULAR; INTRAVENOUS
Status: CANCELLED
Start: 2021-10-15

## 2021-10-15 NOTE — TELEPHONE ENCOUNTER
Routing encounter to Dr. Anders to place new CAM order for prolia if needed, and advise if any labs are needed prior to injection.  Jessy Chow,

## 2021-10-18 NOTE — TELEPHONE ENCOUNTER
I called patient to schedule.    Patient said she just had her labs done 10-12 (which she did).    Patient is already scheduled for her prolia injection on 10-.    Message sent to Shayy LAFLEUR) to make sure medication was ordered.  No dental work, extractions or jaw bone surgery within the past month.    Jessy Chow,

## 2021-10-19 ENCOUNTER — OFFICE VISIT (OUTPATIENT)
Dept: INTERNAL MEDICINE | Facility: CLINIC | Age: 86
End: 2021-10-19
Payer: COMMERCIAL

## 2021-10-19 VITALS
HEART RATE: 74 BPM | BODY MASS INDEX: 25.13 KG/M2 | OXYGEN SATURATION: 96 % | TEMPERATURE: 97.7 F | WEIGHT: 128 LBS | DIASTOLIC BLOOD PRESSURE: 80 MMHG | HEIGHT: 60 IN | SYSTOLIC BLOOD PRESSURE: 120 MMHG

## 2021-10-19 DIAGNOSIS — Z12.31 ENCOUNTER FOR SCREENING MAMMOGRAM FOR BREAST CANCER: ICD-10-CM

## 2021-10-19 DIAGNOSIS — I10 HYPERTENSION GOAL BP (BLOOD PRESSURE) < 140/90: ICD-10-CM

## 2021-10-19 DIAGNOSIS — M81.0 AGE-RELATED OSTEOPOROSIS WITHOUT CURRENT PATHOLOGICAL FRACTURE: ICD-10-CM

## 2021-10-19 DIAGNOSIS — Z00.00 ENCOUNTER FOR MEDICARE ANNUAL WELLNESS EXAM: ICD-10-CM

## 2021-10-19 DIAGNOSIS — Z23 NEED FOR PROPHYLACTIC VACCINATION AND INOCULATION AGAINST INFLUENZA: ICD-10-CM

## 2021-10-19 DIAGNOSIS — N18.31 STAGE 3A CHRONIC KIDNEY DISEASE (H): ICD-10-CM

## 2021-10-19 DIAGNOSIS — Z00.01 ENCOUNTER FOR GENERAL ADULT MEDICAL EXAMINATION WITH ABNORMAL FINDINGS: Primary | ICD-10-CM

## 2021-10-19 PROCEDURE — 99397 PER PM REEVAL EST PAT 65+ YR: CPT | Mod: 25 | Performed by: INTERNAL MEDICINE

## 2021-10-19 PROCEDURE — 90662 IIV NO PRSV INCREASED AG IM: CPT | Performed by: INTERNAL MEDICINE

## 2021-10-19 PROCEDURE — G0008 ADMIN INFLUENZA VIRUS VAC: HCPCS | Performed by: INTERNAL MEDICINE

## 2021-10-19 ASSESSMENT — ACTIVITIES OF DAILY LIVING (ADL): CURRENT_FUNCTION: NO ASSISTANCE NEEDED

## 2021-10-19 ASSESSMENT — MIFFLIN-ST. JEOR: SCORE: 948.13

## 2021-10-19 NOTE — PATIENT INSTRUCTIONS
Patient Education   Personalized Prevention Plan  You are due for the preventive services outlined below.  Your care team is available to assist you in scheduling these services.  If you have already completed any of these items, please share that information with your care team to update in your medical record.  Health Maintenance Due   Topic Date Due     ANNUAL REVIEW OF HM ORDERS  Never done     PHQ-2  01/01/2021     Eye Exam  08/31/2021     Flu Vaccine (1) 09/01/2021     FALL RISK ASSESSMENT  10/15/2021     Annual Wellness Visit  10/15/2021           Watch home Blood Pressure:   Goal is less than 140/90.  If you tend to run higher, let me know so we can treat this.       Call to schedule imaging   -- mammogram at [facility].

## 2021-10-19 NOTE — PROGRESS NOTES
"SUBJECTIVE:   Priscilla Shaw is a 86 year old female who presents for Preventive Visit.    85 y/o F here for AFE      H/o osteoporosis (Prolia, due 10/21, has appt??  ), HTN, CKD 3a.   Recent BMP, FLP, CBC all look great.     Lives in apartment, in [place].   Lives alone.     Her sister lives one floor above her  Her son lives in [facility].      Has lost weight intentionally.   She had been in [employer] for years, on and off.   Has lost 20# in past 2 years.      prolia shot in 10/28/21.          Patient has been advised of split billing requirements and indicates understanding: Yes   Are you in the first 12 months of your Medicare coverage?  No    Healthy Habits:    In general, how would you rate your overall health?  Very good    Frequency of exercise:  2-3 days/week    Duration of exercise:  15-30 minutes    Do you usually eat at least 4 servings of fruit and vegetables a day, include whole grains    & fiber and avoid regularly eating high fat or \"junk\" foods?  Yes    Taking medications regularly:  Yes    Barriers to taking medications:  None    Medication side effects:  None    Ability to successfully perform activities of daily living:  No assistance needed    Home Safety:  No safety concerns identified    Hearing Impairment:  No hearing concerns    In the past 6 months, have you been bothered by leaking of urine?  No    In general, how would you rate your overall mental or emotional health?  Very good      PHQ-2 Total Score:    Additional concerns today:  No    Do you feel safe in your environment? Yes    Have you ever done Advance Care Planning? (For example, a Health Directive, POLST, or a discussion with a medical provider or your loved ones about your wishes): Yes, advance care planning is on file.       Fall risk       Cognitive Screening   1) Repeat 3 items (Leader, Season, Table)    2) Clock draw: NORMAL  3) 3 item recall: Recalls 3 objects  Results: 3 items recalled: COGNITIVE IMPAIRMENT " LESS LIKELY    Mini-CogTM Copyright CRISTINA Hernandez. Licensed by the author for use in [facility]; reprinted with permission (jasbir@.[email or web address]). All rights reserved.      Do you have sleep apnea, excessive snoring or daytime drowsiness?: no    Reviewed and updated as needed this visit by clinical staff                 Reviewed and updated as needed this visit by Provider                Social History     Tobacco Use     Smoking status: Former Smoker     Packs/day: 0.00     Years: 50.00     Pack years: 0.00     Types: Cigarettes     Start date: 7/15/1957     Quit date: 2009     Years since quittin.0     Smokeless tobacco: Never Used     Tobacco comment: I have no current problems due to smoking   Substance Use Topics     Alcohol use: Yes     Comment: 1 glass of wine per day     If you drink alcohol do you typically have >3 drinks per day or >7 drinks per week? No    Alcohol Use 10/15/2020   Prescreen: >3 drinks/day or >7 drinks/week? -   Prescreen: >3 drinks/day or >7 drinks/week? No           No Concerns     Current providers sharing in care for this patient include:   Patient Care Team:  Laura Anders MD as PCP - General (Internal Medicine)  Laura Anders MD as Assigned PCP    The following health maintenance items are reviewed in Epic and correct as of today:  Health Maintenance Due   Topic Date Due     ANNUAL REVIEW OF HM ORDERS  Never done     PHQ-2  2021     EYE EXAM  2021     INFLUENZA VACCINE (1) 2021     FALL RISK ASSESSMENT  10/15/2021     MEDICARE ANNUAL WELLNESS VISIT  10/15/2021     Labs reviewed in EPIC  BP Readings from Last 3 Encounters:   10/19/21 120/80   10/15/20 126/70   19 118/78    Wt Readings from Last 3 Encounters:   10/19/21 58.1 kg (128 lb)   21 61.1 kg (134 lb 9.6 oz)   10/15/20 63 kg (139 lb)                  Patient Active Problem List   Diagnosis     CARDIOVASCULAR SCREENING; LDL GOAL LESS THAN 130     Hypertension goal BP (blood  pressure) < 140/90     Osteoporosis     Atrophy, vulva     Health Care Home     Advance care planning     Vertigo     Mastodynia     Cataract     CKD (chronic kidney disease) stage 3, GFR 30-59 ml/min (H)     Stage 3a chronic kidney disease (H)     Past Surgical History:   Procedure Laterality Date     APPENDECTOMY       COLONOSCOPY  2014     HYSTERECTOMY, BERNADETTE         Social History     Tobacco Use     Smoking status: Former Smoker     Packs/day: 0.00     Years: 50.00     Pack years: 0.00     Types: Cigarettes     Start date: 7/15/1957     Quit date: 2009     Years since quittin.0     Smokeless tobacco: Never Used     Tobacco comment: I have no current problems due to smoking   Substance Use Topics     Alcohol use: Yes     Comment: 1 glass of wine per day     Family History   Problem Relation Age of Onset     Hypertension Mother      Cerebrovascular Disease Mother      Heart Disease Sister         pacemaker      Hypertension Sister      Osteoporosis Sister      Heart Disease Son      Heart Disease Sister      Osteoporosis Sister      Prostate Cancer Father          Current Outpatient Medications   Medication Sig Dispense Refill     amLODIPine (NORVASC) 5 MG tablet Take 1 tablet (5 mg) by mouth daily 90 tablet 3     aspirin 81 MG tablet Take 1 tablet by mouth daily.       COD LIVER OIL OR CAPS 1 daily        denosumab (PROLIA) 60 MG/ML SOSY injection Inject 1 mL (60 mg) Subcutaneous every 6 months 1 mL 1     denosumab (PROLIA) 60 MG/ML SOSY injection Inject 1 mL (60 mg) Subcutaneous every 6 months 1 mL 1     magnesium chloride 535 (64 Mg) MG TBEC CR tablet Take 535 mg by mouth daily       Multiple Vitamins-Minerals (CENTRUM SILVER) per tablet Take 1 tablet by mouth daily. 1 tablet       SUPER B COMPLEX OR TABS 1 daily        VIACTIV 500-500-40 MG-UNT-MCG OR CHEW 1 daily       VITAMIN D 400 UNIT OR TABS 1daily       Allergies   Allergen Reactions     Fosamax [Alendronic Acid] Other (See Comments)      "Stomach pain     Recent Labs   Lab Test 10/12/21  0820 10/08/20  0748 08/09/19  0838 08/09/19  0838 08/03/18  0806 08/03/18  0806 07/22/16  0745 06/05/15  0827 05/30/14  0805 05/30/14  0805   LDL 81  --   --   --   --  88  --  94   < > 106   HDL 76  --   --   --   --  73  --  75   < > 63   TRIG 141  --   --   --   --  134  --  120   < > 109   CR 1.05* 0.92   < > 0.96   < > 0.89   < > 0.92   < > 0.96   GFRESTIMATED 49* 58*   < > 55*   < > 61   < > 59*   < > 57*   GFRESTBLACK  --  67  --  64   < > 73   < > 72   < > 68   POTASSIUM 4.2 4.1   < > 4.0   < > 4.2   < > 4.1   < > 4.5   TSH  --   --   --   --   --  1.52  --   --   --  1.40    < > = values in this interval not displayed.           Mammogram Screening - Patient over age 75, has elected to continue with screening.  Pertinent mammograms are reviewed under the imaging tab.    Review of Systems  Constitutional, HEENT, cardiovascular, pulmonary, GI, , musculoskeletal, neuro, skin, endocrine and psych systems are negative, except as otherwise noted.    OBJECTIVE:   /80 (BP Location: Right arm, Patient Position: Sitting, Cuff Size: Adult Regular)   Pulse 74   Temp 97.7  F (36.5  C) (Oral)   Ht 1.518 m (4' 11.75\")   Wt 58.1 kg (128 lb)   SpO2 96%   BMI 25.21 kg/m   Estimated body mass index is 26.5 kg/m  as calculated from the following:    Height as of 10/15/20: 1.518 m (4' 11.76\").    Weight as of 2/16/21: 61.1 kg (134 lb 9.6 oz).  Physical Exam  GENERAL APPEARANCE: healthy, alert and no distress  EYES: Eyes grossly normal to inspection, PERRL and conjunctivae and sclerae normal  HENT: ear canals and TM's normal, nose and mouth without ulcers or lesions, oropharynx clear and oral mucous membranes moist  NECK: no adenopathy, no asymmetry, masses, or scars and thyroid normal to palpation  RESP: lungs clear to auscultation - no rales, rhonchi or wheezes  BREAST: normal without masses, tenderness or nipple discharge and no palpable axillary masses or " "adenopathy  CV: regular rate and rhythm, normal S1 S2, no S3 or S4, no murmur, click or rub, no peripheral edema and peripheral pulses strong  ABDOMEN: soft, nontender, no hepatosplenomegaly, no masses and bowel sounds normal   (female): normal female external genitalia, normal urethral meatus, vaginal mucosal atrophy noted,   without masses or abnormal discharge   (female): bimanual only, hysterectomy status  MS: no musculoskeletal defects are noted and gait is age appropriate without ataxia  SKIN: no suspicious lesions or rashes  NEURO: Normal strength and tone, sensory exam grossly normal, mentation intact and speech normal  PSYCH: mentation appears normal and affect normal/bright    Diagnostic Test Results:  Labs reviewed in Epic  See HPI    ASSESSMENT / PLAN:       ICD-10-CM    1. Encounter for general adult medical examination with abnormal findings  Z00.01    2. Encounter for Medicare annual wellness exam  Z00.00 Adult Eye Referral   3. Age-related osteoporosis without current pathological fracture  M81.0 OFFICE/OUTPT VISIT,EST,LEVL II   4. Stage 3a chronic kidney disease (H)  N18.31 OFFICE/OUTPT VISIT,EST,LEVL II   5. Hypertension goal BP (blood pressure) < 140/90  I10 OFFICE/OUTPT VISIT,EST,LEVL II   6. Need for prophylactic vaccination and inoculation against influenza  Z23 INFLUENZA, QUAD, HIGH DOSE, PF, 65YR + (FLUZONE HD)     ADMIN INFLUENZA (For MEDICARE Patients ONLY) []   7. Encounter for screening mammogram for breast cancer  Z12.31 *MA Screening Digital Bilateral       87 y/o F in excellent health  Next Prolia shot due in April 2022  BP well controlled,   CKD3a is \"age appropriate\", discussed.     She has her booster shot for COVID scheduled for late this month  Gave her flu shot today  She will call if BP high at home.     Patient has been advised of split billing requirements and indicates understanding: Yes  COUNSELING:  Reviewed preventive health counseling, as reflected in patient " "instructions       Regular exercise       Immunizations    Vaccinated for: Influenza          Estimated body mass index is 26.5 kg/m  as calculated from the following:    Height as of 10/15/20: 1.518 m (4' 11.76\").    Weight as of 2/16/21: 61.1 kg (134 lb 9.6 oz).        She reports that she quit smoking about 12 years ago. Her smoking use included cigarettes. She started smoking about 64 years ago. She smoked 0.00 packs per day for 50.00 years. She has never used smokeless tobacco.      Appropriate preventive services were discussed with this patient, including applicable screening as appropriate for cardiovascular disease, diabetes, osteopenia/osteoporosis, and glaucoma.  As appropriate for age/gender, discussed screening for colorectal cancer, prostate cancer, breast cancer, and cervical cancer. Checklist reviewing preventive services available has been given to the patient.    Reviewed patients plan of care and provided an AVS. The Basic Care Plan (routine screening as documented in Health Maintenance) for Priscilla meets the Care Plan requirement. This Care Plan has been established and reviewed with the Patient.    Counseling Resources:  ATP IV Guidelines  Pooled Cohorts Equation Calculator  Breast Cancer Risk Calculator  Breast Cancer: Medication to Reduce Risk  FRAX Risk Assessment  ICSI Preventive Guidelines  Dietary Guidelines for Americans, 2010  [employer] MyPlate  ASA Prophylaxis  Lung CA Screening    Laura Anders MD  [facility]    Identified Health Risks:  "

## 2021-10-28 ENCOUNTER — TELEPHONE (OUTPATIENT)
Dept: FAMILY MEDICINE | Facility: CLINIC | Age: 86
End: 2021-10-28

## 2021-10-28 ENCOUNTER — ALLIED HEALTH/NURSE VISIT (OUTPATIENT)
Dept: NURSING | Facility: CLINIC | Age: 86
End: 2021-10-28
Payer: COMMERCIAL

## 2021-10-28 DIAGNOSIS — M81.0 AGE-RELATED OSTEOPOROSIS WITHOUT CURRENT PATHOLOGICAL FRACTURE: Primary | ICD-10-CM

## 2021-10-28 DIAGNOSIS — N18.31 CHRONIC KIDNEY DISEASE, STAGE 3A (H): ICD-10-CM

## 2021-10-28 PROCEDURE — 96372 THER/PROPH/DIAG INJ SC/IM: CPT | Performed by: NURSE PRACTITIONER

## 2021-10-28 NOTE — PATIENT INSTRUCTIONS
You received your Prolia injection today  Your next Injection is due in 6 months (around April)  If you plan on having any dental work done within the next 6 months, please let your dentist know that you are on this medication.  We will call in advance to have your next injection scheduled.   Make sure you do not have any dental work completed involving the jaw bone within 1 month prior to your scheduled injection

## 2021-10-28 NOTE — PROGRESS NOTES
Clinic Administered Medication Documentation    Administrations This Visit     denosumab (PROLIA) injection 60 mg     Admin Date  10/28/2021 Action  Given Dose  60 mg Route  Subcutaneous Site  Left Arm Administered By  Madhuri Medrano RN    Ordering Provider: Viri Post APRN CNP    Patient Supplied?: No                  Prolia Documentation    Prior to injection, verified patient identity using patient's name and date of birth. Medication was administered. Please see MAR and medication order for additional information. Patient instructed to remain in clinic for 15 minutes.    Indication: Prolia  (denosumab) is a prescription medicine used to treat osteoporosis in patients who:     Are at high risk for fracture, meaning patients who have had a fracture related to osteoporosis, or who have multiple risk factors for fracture.    Cannot use another osteoporosis medicine or other osteoporosis medicines did not work well.    The timeline for early/late injections would be 4 weeks early and any time after the 6 month dottie. If a patient receives their injection late, then the subsequent injection would be 6 months from the date that they actually received the injection.    When was the last injection?  April  Was the last injection at least 6 months ago? Yes  Has the prior authorization been completed?  Yes  Is there an active order (within the past 365 days) in the chart?  Yes  Patient denied having dental work involving the bone in the past 6 months?  Yes  Patient denies a plan to dental work involving the bone in the next 6 months? Yes    The following steps were completed to comply with the REMS program for Prolia:    Reviewed information in the Medication Guide and Patient Counseling Chart, including the serious risks of Prolia  and the symptoms of each risk.    Advised patient to seek prompt medical attention if they have signs or symptoms of any of the serious risks.    Provided each patient a copy of  the Medication Guide and Patient Brochure.      Was entire vial of medication used? Yes  Vial/Syringe: Syringe  Expiration Date:  02/28/2004  Was the medication not being billed by clinic? No

## 2021-10-28 NOTE — TELEPHONE ENCOUNTER
Postponing this encounter-    Patient due for Prolia injection around 04/28/2022      1. Please ask provider to put in a new CAM order for (Pre Auth) Prolia injection if one is not current and any lab orders if needed (it is up to the provider's discretion on how often labs are checked once Prolia injections have been started)    2. Please contact patient to schedule the injection with RN 2 weeks out    3. Ask MA to re-order med 1 week in advance    Make sure patient has NOT had any dental work involving the jaw bone (i.e teeth extraction) 1 month prior to injection     For questions about the cost, patient may contact our CONSUMER PRICE LINE at [phone] for an estimate.     CHECK STATUS PRIOR TO ADMINISTRATION- Chart Review > Referrals tab - Select the Referral to view the report    Madhuri Medrano RN on 10/28/2021 at 12:28 PM  [facility]

## 2022-01-03 DIAGNOSIS — I10 HYPERTENSION GOAL BP (BLOOD PRESSURE) < 140/90: Primary | ICD-10-CM

## 2022-01-04 RX ORDER — AMLODIPINE BESYLATE 5 MG/1
5 TABLET ORAL DAILY
Qty: 90 TABLET | Refills: 3 | Status: SHIPPED | OUTPATIENT
Start: 2022-01-04 | End: 2022-11-07

## 2022-01-18 ENCOUNTER — ANCILLARY PROCEDURE (OUTPATIENT)
Dept: MAMMOGRAPHY | Facility: CLINIC | Age: 87
End: 2022-01-18
Attending: INTERNAL MEDICINE
Payer: COMMERCIAL

## 2022-01-18 DIAGNOSIS — Z12.31 ENCOUNTER FOR SCREENING MAMMOGRAM FOR BREAST CANCER: ICD-10-CM

## 2022-01-18 PROCEDURE — 77067 SCR MAMMO BI INCL CAD: CPT | Mod: TC | Performed by: RADIOLOGY

## 2022-03-21 NOTE — TELEPHONE ENCOUNTER
Spoke to patient and she verified that she is planning to get her prolia injection here at [facility] as she has in the past.  Informed her of the scheduling error and I cancelled the prolia injection that was scheduled for 10-, and 4-.    Patient is scheduled to see a periodontist on March 31st.  She is not sure what they are planning to do.  Informed patient we will keep her injection scheduled for 4-28 for now.  I will postpone this encounter to April 1st and will call patient back to find out if she had any dental work done.    Will still need new CAM order placed.  Patient may need labs prior?  Will also need to order medication from Shayy LAFLEUR).    Jessy Chow,

## 2022-03-21 NOTE — TELEPHONE ENCOUNTER
This patient has 3 future appts for prolia injection at [facility]   However, the appointments were made by the cancer/infusion center staff?  There is an infusion order for Prolia but not a CAM order for Prolia.  [facility] does not perform infusion orders. We would need a CAM order.    Please verify with patient that she is getting her Prolia injections with the [facility] location then follow the workflow below to obtain a CAM order from provider, etc.  Please also cancel the last 2 future appointments as we do not schedule Prolia this far in advance.   We only schedule 1 injection at a time in order to complete the workflow needed for each injection.          Mary Spears RN  [facility]

## 2022-04-01 ENCOUNTER — TELEPHONE (OUTPATIENT)
Dept: FAMILY MEDICINE | Facility: CLINIC | Age: 87
End: 2022-04-01
Payer: COMMERCIAL

## 2022-04-01 DIAGNOSIS — G89.29 CHRONIC PAIN OF RIGHT KNEE: Primary | ICD-10-CM

## 2022-04-01 DIAGNOSIS — M25.561 CHRONIC PAIN OF RIGHT KNEE: Primary | ICD-10-CM

## 2022-04-01 NOTE — TELEPHONE ENCOUNTER
Patient calling in with concerns regarding the Lateral side of Right knee. She would like a referral to orthopedics.    She believes this to be in association with her Prolia injections (she thought ACL).  This has been ongoing for the past 4-5 months but has gotten worse over the last month.    She has been wearing a knee brace sleeve, using muscle cream for the pain, and occasionally cold packs.  She massages her knee every morning which seems to help.    The pain especially keeps her up at night and is very painful upon waking.    Does not feel or look like the knee is swollen  Pain feels like stabbing pain when she gets up to walk.  Does not hurt to bend the knee but if she needs to lift her leg above 4 inches it becomes more painful. Going down the stairs is almost unbearable.    She is otherwise able to ambulate with difficulty.    Would you prefer virtual or in person visit?  Pt ok to wait for response back on monday        PHOEBE Meredith    Triage Nurse  [facility]  Appointment line: [phone]  [facility], 24 hour nurse line, available by calling clinic at [phone] and following prompts.

## 2022-04-01 NOTE — TELEPHONE ENCOUNTER
Routing to Dr. Anders to place new CAM order for prolia and advise if any labs are needed prior to injection.  Jessy Chow,

## 2022-04-01 NOTE — TELEPHONE ENCOUNTER
Form signed by Dr. Anders with okay to proceed with dental treatment and faxed back to [phone].    Jessy Chow,

## 2022-04-01 NOTE — TELEPHONE ENCOUNTER
Patient notified of provider's message as written. Patient verbalized understanding. Advised that if she doesn't hear from them she can call [phone] to schedule.     Mariela Perales RN   [facility]

## 2022-04-01 NOTE — TELEPHONE ENCOUNTER
"Fax received from [facility] (ph: [phone]), Dr. Iris Corona.  Patient is scheduled for periodontal surgery involving manipulation of ging flap, periosteal and alveolar ridge.  Surgery is scheduled for May 12th.    Note states:  \"past history of bisphosphonate and current prolia every 6 months for 4 years.  Please advise on drug holiday\".    Dr. Anders reviewed form and asked if patient could postpone prolia injection for 3-4 weeks.  Patient called and was okay with this plan.  Dentist told her to wait a month.  Form given to Dr. Anders to sign.    Prolia injection scheduled for Monday, June 20th at 1 p.m.    Jessy Chow,         "

## 2022-04-01 NOTE — TELEPHONE ENCOUNTER
She is right that prolia can cause bone pain, but this would occur after shot and be more diffuse, rather in one area.. and would not persist      That being said, will refer to ortho for eval and treat.  She will get call on Monday likely from .

## 2022-04-12 ENCOUNTER — OFFICE VISIT (OUTPATIENT)
Dept: ORTHOPEDICS | Facility: CLINIC | Age: 87
End: 2022-04-12
Payer: COMMERCIAL

## 2022-04-12 VITALS
BODY MASS INDEX: 25.13 KG/M2 | HEIGHT: 60 IN | SYSTOLIC BLOOD PRESSURE: 132 MMHG | DIASTOLIC BLOOD PRESSURE: 70 MMHG | WEIGHT: 128 LBS

## 2022-04-12 DIAGNOSIS — M17.11 PRIMARY OSTEOARTHRITIS OF RIGHT KNEE: Primary | ICD-10-CM

## 2022-04-12 DIAGNOSIS — M25.561 CHRONIC PAIN OF RIGHT KNEE: ICD-10-CM

## 2022-04-12 DIAGNOSIS — G89.29 CHRONIC PAIN OF RIGHT KNEE: ICD-10-CM

## 2022-04-12 PROCEDURE — 99203 OFFICE O/P NEW LOW 30 MIN: CPT | Performed by: PEDIATRICS

## 2022-04-12 NOTE — LETTER
4/12/2022         RE: Priscilla Shaw  [address]  [place] [address]        Dear Colleague,    Thank you for referring your patient, Priscilla Shaw, to the [facility]. Please see a copy of my visit note below.    ASSESSMENT & PLAN    Priscilla was seen today for pain.    Diagnoses and all orders for this visit:    Primary osteoarthritis of right knee    Chronic pain of right knee  -     Orthopedic  Referral  -     XR Knee Standing AP Bilat [place] Bilat Lat Right; Future            See Patient Instructions  Patient Instructions   X-rays show degenerative change (arthritis) in the right knee, more in the lateral compartment. I think this is the primary pain generator, though there is also some muscular/soft tissue component in the leg as well.  Discussed nature of degenerative arthrosis of the knee.  Discussed symptom treatment with over-the-counter medications, ice or heat, topical treatments, and rest if needed.  Discussed use of compression or bracing for comfort.  Discussed potential benefits of rehabilitation, to maintain or improve function at the knee. Plan physical therapy next; start with 1-2 visits, with additional visits per therapist recommendation.  Discussed benefits of exercise and remaining active.  Discussed injection therapy, steroid injection.  Also briefly discussed future consideration of referral to orthopedic surgery for further evaluation and discussion of additional treatment options.    Plan PT next. Monitor course 1 month with PT to start. Will otherwise leave follow up open ended.    If you have any further questions for your physician or physician s care team you can call [phone] and use option 3 to leave a voice message. Calls received during business hours will be returned same day.          Mathew Keller DO  [facility]      CC: Laura Anders      -----  Chief Complaint  "  Patient presents with     Right Knee - Pain       SUBJECTIVE  Priscilla Shaw is a/an 86 year old female who is seen in consultation at the request of  Laura Anders M.D. for evaluation of right knee and lower leg pain.  Pain has been present for a few months with no known injury.    Tingling into her lower leg near her ankle.  Denies any low back pain.   Pain is constant.    Had similar issues for the left lower leg in the past after stopping short while bowling.  Was seen, but no treatment.       The patient is seen by themselves.      Onset: 2+ month(s) ago. Reports insidious onset without acute precipitating event.  Location of Pain: right knee and lower leg   Worsened by: getting out of bed, getting feet onto the floor, descending stairs   Better with: nothing   Treatments tried: Tylenol,and Voltaren Gel, Aspirin   Associated symptoms: feeling of instability    Orthopedic/Surgical history: NO for right leg   Social History/Occupation: retired     No family history pertinent to patient's problem today.   **  Notes pain is more with lying supine, legs relaxed.  Pain is primarily lateral knee, feels more like soft tissue to pt.  Recalls doing some walking last summer, had some leg pain at the time. Waxing/waning after that time, but pain worse past couple months.  Occasional right knee mild pop sensation, but with some benefit with pain.  Tingling right LE > left LE; not present currently. When present, is lateral lower leg.      REVIEW OF SYSTEMS:  Review of Systems   All other systems reviewed and are negative.        OBJECTIVE:  /70   Ht 1.518 m (4' 11.75\")   Wt 58.1 kg (128 lb)   BMI 25.21 kg/m     General: healthy, alert and in no distress  HEENT: no scleral icterus or conjunctival erythema  Skin: no suspicious lesions or rash. No jaundice.  CV: distal perfusion intact   Resp: normal respiratory effort without conversational dyspnea   Psych: normal mood and affect  Gait: ambulates " independently  Neuro: Normal light sensory exam of  extremity       Right Knee exam    Inspection:   + effusion   no ecchymosis    ROM: mild limitation end range stiffness, some lateral pain    Tender:      lateral patellar border       lateral joint line       Proximal lateral lower leg    Special Tests:      neg (-) Lachman       neg (-) anterior drawer       neg (-) posterior drawer       neg (-) varus       neg (-) valgus        RADIOLOGY:  I independently ordered, visualized and reviewed these images with the patient  Degenerative change. No acute bony abnormality.      XR Knee Standing AP Bilat [place] Bilat Lat Right    Narrative    XR KNEE STANDING AP BILAT [place] BILAT LAT RT 4/12/2022 10:29 AM     HISTORY: Chronic pain of right knee; Chronic pain of right knee    COMPARISON: None.      Impression    IMPRESSION:     Right: Mild hypertrophic change in the medial compartment. Moderate  narrowing and mild hypertrophic change in the lateral compartment.  Mild hypertrophic change of the patellofemoral compartment.  Moderate-sized knee joint effusion. Normal patellar alignment.    Left: Advanced medial compartment narrowing with mild hypertrophic  change. Mild hypertrophic change in the lateral and patellofemoral  compartments.    MATHEW CORCKETT MD         SYSTEM ID:  [id number]                 Again, thank you for allowing me to participate in the care of your patient.        Sincerely,        Mathew Keller DO

## 2022-04-12 NOTE — PROGRESS NOTES
ASSESSMENT & PLAN    Priscilla was seen today for pain.    Diagnoses and all orders for this visit:    Primary osteoarthritis of right knee    Chronic pain of right knee  -     Orthopedic  Referral  -     XR Knee Standing AP Bilat [place] Bilat Lat Right; Future            See Patient Instructions  Patient Instructions   X-rays show degenerative change (arthritis) in the right knee, more in the lateral compartment. I think this is the primary pain generator, though there is also some muscular/soft tissue component in the leg as well.  Discussed nature of degenerative arthrosis of the knee.  Discussed symptom treatment with over-the-counter medications, ice or heat, topical treatments, and rest if needed.  Discussed use of compression or bracing for comfort.  Discussed potential benefits of rehabilitation, to maintain or improve function at the knee. Plan physical therapy next; start with 1-2 visits, with additional visits per therapist recommendation.  Discussed benefits of exercise and remaining active.  Discussed injection therapy, steroid injection.  Also briefly discussed future consideration of referral to orthopedic surgery for further evaluation and discussion of additional treatment options.    Plan PT next. Monitor course 1 month with PT to start. Will otherwise leave follow up open ended.    If you have any further questions for your physician or physician s care team you can call [phone] and use option 3 to leave a voice message. Calls received during business hours will be returned same day.          Mathew Keller [facility]      CC: Laura Anders      -----  Chief Complaint   Patient presents with     Right Knee - Pain       SUBJECTIVE  Priscilla Shaw is a/an 86 year old female who is seen in consultation at the request of  Laura Anders M.D. for evaluation of right knee and lower leg pain.  Pain has been present for a few months  "with no known injury.    Tingling into her lower leg near her ankle.  Denies any low back pain.   Pain is constant.    Had similar issues for the left lower leg in the past after stopping short while bowling.  Was seen, but no treatment.       The patient is seen by themselves.      Onset: 2+ month(s) ago. Reports insidious onset without acute precipitating event.  Location of Pain: right knee and lower leg   Worsened by: getting out of bed, getting feet onto the floor, descending stairs   Better with: nothing   Treatments tried: Tylenol,and Voltaren Gel, Aspirin   Associated symptoms: feeling of instability    Orthopedic/Surgical history: NO for right leg   Social History/Occupation: retired     No family history pertinent to patient's problem today.   **  Notes pain is more with lying supine, legs relaxed.  Pain is primarily lateral knee, feels more like soft tissue to pt.  Recalls doing some walking last summer, had some leg pain at the time. Waxing/waning after that time, but pain worse past couple months.  Occasional right knee mild pop sensation, but with some benefit with pain.  Tingling right LE > left LE; not present currently. When present, is lateral lower leg.      REVIEW OF SYSTEMS:  Review of Systems   All other systems reviewed and are negative.        OBJECTIVE:  /70   Ht 1.518 m (4' 11.75\")   Wt 58.1 kg (128 lb)   BMI 25.21 kg/m     General: healthy, alert and in no distress  HEENT: no scleral icterus or conjunctival erythema  Skin: no suspicious lesions or rash. No jaundice.  CV: distal perfusion intact   Resp: normal respiratory effort without conversational dyspnea   Psych: normal mood and affect  Gait: ambulates independently  Neuro: Normal light sensory exam of  extremity       Right Knee exam    Inspection:   + effusion   no ecchymosis    ROM: mild limitation end range stiffness, some lateral pain    Tender:      lateral patellar border       lateral joint line       Proximal lateral " lower leg    Special Tests:      neg (-) Lachman       neg (-) anterior drawer       neg (-) posterior drawer       neg (-) varus       neg (-) valgus        RADIOLOGY:  I independently ordered, visualized and reviewed these images with the patient  Degenerative change. No acute bony abnormality.      XR Knee Standing AP Bilat [place] Bilat Lat Right    Narrative    XR KNEE STANDING AP BILAT [place] BILAT LAT RT 4/12/2022 10:29 AM     HISTORY: Chronic pain of right knee; Chronic pain of right knee    COMPARISON: None.      Impression    IMPRESSION:     Right: Mild hypertrophic change in the medial compartment. Moderate  narrowing and mild hypertrophic change in the lateral compartment.  Mild hypertrophic change of the patellofemoral compartment.  Moderate-sized knee joint effusion. Normal patellar alignment.    Left: Advanced medial compartment narrowing with mild hypertrophic  change. Mild hypertrophic change in the lateral and patellofemoral  compartments.    LILIANA CROCKETT MD         SYSTEM ID:  [id number]

## 2022-04-12 NOTE — PATIENT INSTRUCTIONS
X-rays show degenerative change (arthritis) in the right knee, more in the lateral compartment. I think this is the primary pain generator, though there is also some muscular/soft tissue component in the leg as well.  Discussed nature of degenerative arthrosis of the knee.  Discussed symptom treatment with over-the-counter medications, ice or heat, topical treatments, and rest if needed.  Discussed use of compression or bracing for comfort.  Discussed potential benefits of rehabilitation, to maintain or improve function at the knee. Plan physical therapy next; start with 1-2 visits, with additional visits per therapist recommendation.  Discussed benefits of exercise and remaining active.  Discussed injection therapy, steroid injection.  Also briefly discussed future consideration of referral to orthopedic surgery for further evaluation and discussion of additional treatment options.    Plan PT next. Monitor course 1 month with PT to start. Will otherwise leave follow up open ended.    If you have any further questions for your physician or physician s care team you can call [phone] and use option 3 to leave a voice message. Calls received during business hours will be returned same day.

## 2022-04-20 ENCOUNTER — THERAPY VISIT (OUTPATIENT)
Dept: PHYSICAL THERAPY | Facility: CLINIC | Age: 87
End: 2022-04-20
Attending: PEDIATRICS
Payer: COMMERCIAL

## 2022-04-20 DIAGNOSIS — M17.11 PRIMARY OSTEOARTHRITIS OF RIGHT KNEE: ICD-10-CM

## 2022-04-20 DIAGNOSIS — M25.561 ACUTE PAIN OF RIGHT KNEE: ICD-10-CM

## 2022-04-20 PROCEDURE — 97161 PT EVAL LOW COMPLEX 20 MIN: CPT | Mod: GP | Performed by: PHYSICAL THERAPIST

## 2022-04-20 PROCEDURE — 97110 THERAPEUTIC EXERCISES: CPT | Mod: GP | Performed by: PHYSICAL THERAPIST

## 2022-04-20 ASSESSMENT — ACTIVITIES OF DAILY LIVING (ADL)
SWELLING: THE SYMPTOM AFFECTS MY ACTIVITY SEVERELY
AS_A_RESULT_OF_YOUR_KNEE_INJURY,_HOW_WOULD_YOU_RATE_YOUR_CURRENT_LEVEL_OF_DAILY_ACTIVITY?: ABNORMAL
STAND: ACTIVITY IS FAIRLY DIFFICULT
GIVING WAY, BUCKLING OR SHIFTING OF KNEE: THE SYMPTOM AFFECTS MY ACTIVITY MODERATELY
RISE FROM A CHAIR: ACTIVITY IS MINIMALLY DIFFICULT
HOW_WOULD_YOU_RATE_THE_OVERALL_FUNCTION_OF_YOUR_KNEE_DURING_YOUR_USUAL_DAILY_ACTIVITIES?: ABNORMAL
HOW_WOULD_YOU_RATE_THE_CURRENT_FUNCTION_OF_YOUR_KNEE_DURING_YOUR_USUAL_DAILY_ACTIVITIES_ON_A_SCALE_FROM_0_TO_100_WITH_100_BEING_YOUR_LEVEL_OF_KNEE_FUNCTION_PRIOR_TO_YOUR_INJURY_AND_0_BEING_THE_INABILITY_TO_PERFORM_ANY_OF_YOUR_USUAL_DAILY_ACTIVITIES?: 75
RAW_SCORE: 40
SIT WITH YOUR KNEE BENT: ACTIVITY IS NOT DIFFICULT
WALK: ACTIVITY IS MINIMALLY DIFFICULT
GO DOWN STAIRS: ACTIVITY IS MINIMALLY DIFFICULT
WEAKNESS: THE SYMPTOM AFFECTS MY ACTIVITY SLIGHTLY
SQUAT: ACTIVITY IS SOMEWHAT DIFFICULT
LIMPING: THE SYMPTOM AFFECTS MY ACTIVITY SLIGHTLY
GO UP STAIRS: ACTIVITY IS SOMEWHAT DIFFICULT
KNEE_ACTIVITY_OF_DAILY_LIVING_SCORE: 57.14
PAIN: THE SYMPTOM AFFECTS MY ACTIVITY MODERATELY
STIFFNESS: THE SYMPTOM AFFECTS MY ACTIVITY SLIGHTLY
KNEEL ON THE FRONT OF YOUR KNEE: ACTIVITY IS VERY DIFFICULT
KNEE_ACTIVITY_OF_DAILY_LIVING_SUM: 40

## 2022-04-20 NOTE — PROGRESS NOTES
Physical Therapy Initial Evaluation  Subjective:  The history is provided by the patient. No  was used.   Therapist Generated HPI Evaluation  Problem details: Pt presents to clinic w/ c/o chronic R knee pain that got worse 2 months ago. In the past, R knee pain used to come & go, not interfering ADLs. Currently, pain is located on lateral R knee radiating down to lateral calf. In AM, R knee is more painful (puts pain relieving gel on). Going up stairs is no problem, but going down causes R knee pain. Xrays showed R knee OA. Feels like pain has gotten better over time. Pt is very active & enjoys bowling & walking. Have not been using heat or ice. Never had PT before for knees.Sleep has been ok. .         Type of problem:  Right knee.    This is a chronic condition.    Where condition occurred: for unknown reasons.  Patient reports pain:  Lateral and in the joint.  Pain is described as aching and is constant.  Pain radiates to:  Other (Lateral calf). Pain is worse in the A.M..  Since onset symptoms are gradually improving.  Associated symptoms:  Loss of strength and loss of motion/stiffness. Symptoms are exacerbated by descending stairs, walking and bending/squatting  and relieved by analgesics, rest and NSAID's.  Special tests included:  X-ray.    Barriers include:  None as reported by patient.    Patient Health History  Priscilla Shaw being seen for Physical therapy for R knee.     Date of Onset: 2 months ago.   Problem occurred: Old age arthritis   Pain is reported as 5/10 on pain scale.  General health as reported by patient is good.  Pertinent medical history includes: high blood pressure, kidney disease and osteoarthritis.   Red flags:  None as reported by patient.     Surgeries include:  Other. Other surgery history details: Hysterectomy.    Current medications:  High blood pressure medication. Other medications details: Prolia.    Current occupation is Retired.                                        Objective:  System                                           Hip Evaluation    Hip Strength:    Flexion:   Left: 4+/5   Pain:  Right: 5/5   Pain:                      Abduction:  Left: 3/5    -   Pain:Right: 3/5   -   Pain:                           Knee Evaluation:  ROM:  Strength wnl knee: R quad set fair w/ less VMO.  AROM      Extension:  Left: -8, pain at end-range    Right:  121  Flexion: Left: 125    Right: -3        Strength:     Extension:  Left: 4+/5    Pain:-      Right: 4+/5    Pain:-  Flexion:  Left: 5/5    Pain:-      Right: 5/5    Pain:-      Quad Set Right: Fair    Pain:      Palpation:  Palpation of knee: P posterolateral proximal calf pain upon palpation.    Right knee tenderness present at:  Lateral Joint Line  Edema:  Normal                General Evaluation:                              Gait:  normal                                         ROS    Assessment/Plan:    Patient is a 86 year old female with right side knee complaints.    Patient has the following significant findings with corresponding treatment plan.                Diagnosis 1:  R knee pain  Pain -  manual therapy, self management, education and home program  Decreased ROM/flexibility - manual therapy, therapeutic exercise, therapeutic activity and home program  Decreased strength - therapeutic exercise, therapeutic activities and home program    Therapy Evaluation Codes:   1) History comprised of:   Personal factors that impact the plan of care:      None.    Comorbidity factors that impact the plan of care are:      High blood pressure.     Medications impacting care: High blood pressure.  2) Examination of Body Systems comprised of:   Body structures and functions that impact the plan of care:      Knee.   Activity limitations that impact the plan of care are:      Squatting/kneeling, Stairs and Walking.  3) Clinical presentation characteristics are:   Stable/Uncomplicated.  4) Decision-Making    Low complexity using  standardized patient assessment instrument and/or measureable assessment of functional outcome.  Cumulative Therapy Evaluation is: Low complexity.    Previous and current functional limitations:  (See Goal Flow Sheet for this information)    Short term and Long term goals: (See Goal Flow Sheet for this information)     Communication ability:  Patient appears to be able to clearly communicate and understand verbal and written communication and follow directions correctly.  Treatment Explanation - The following has been discussed with the patient:   RX ordered/plan of care  Anticipated outcomes  Possible risks and side effects  This patient would benefit from PT intervention to resume normal activities.   Rehab potential is good.    Frequency:   1 every other week  Duration:  for 12 weeks  Discharge Plan:  Achieve all LTG.  Independent in home treatment program.  Reach maximal therapeutic benefit.    Please refer to the daily flowsheet for treatment today, total treatment time and time spent performing 1:1 timed codes.

## 2022-04-20 NOTE — PROVIDER NOTIFICATION
[facility]    OUTPATIENT Physical Therapy ORTHOPEDIC EVALUATION  PLAN OF TREATMENT FOR OUTPATIENT REHABILITATION  (COMPLETE FOR INITIAL CLAIMS ONLY)  Patient's Last Name, First Name, M.I.  YOB: 1935  Priscilla Shaw    Provider s Name:  [facility]   Medical Record No.  [id number]   Start of Care Date:  04/20/22   Onset Date:   04/12/22   Type:     _X__PT   ___OT Medical Diagnosis:    Encounter Diagnoses   Name Primary?    Primary osteoarthritis of right knee     Acute pain of right knee         Treatment Diagnosis:  R knee pain        Goals:     04/20/22 0500   Body Part   Goals listed below are for R knee   Goal #1   Goal #1 stairs   Previous Functional Level No restrictions   Current Functional Level Descend stairs   Performance Level R knee pain >2/10 one flight   STG Target Performance Descend stairs   Performance Level R knee pain 0/10 one flight   Rationale to enter/leave the house safely  (Pt lives in apartment building on 3rd floor)   Due Date 05/18/22   LTG Target Performance Descend stairs   Performance Level R knee pain 0/10 3 flights   Rationale to enter/leave the house safely   Due Date 07/13/22   Goal #2   Goal #2 ambulation   Previous Functional Level No restrictions   Current Functional Level Minutes patient can walk   Performance Level 30 min w/ R knee pain >2/10   STG Target Performance Minutes patient will be able to walk   Performance Level 30 min w/ R knee pain 0/10   Rationale to promote a healthy and active lifestyle   Due Date 05/18/22    LTG Target Performance Hours patient will be able to walk   Performance Level 1 hour or greater w/ R knee pain 0/10   Rationale to promote a healthy and active lifestyle   Due Date 07/13/22       Therapy Frequency:  1 x every other week  Predicted Duration of Therapy Intervention:  12 weeks    Glenna  Cristiano, PT                 I CERTIFY THE NEED FOR THESE SERVICES FURNISHED UNDER        THIS PLAN OF TREATMENT AND WHILE UNDER MY CARE     (Physician attestation of this document indicates review and certification of the therapy plan).                     Certification Date From:  04/20/22   Certification Date To:  07/13/22    Referring Provider:  Mathew Keller    Initial Assessment        See Epic Evaluation SOC Date: 04/20/22

## 2022-05-04 ENCOUNTER — THERAPY VISIT (OUTPATIENT)
Dept: PHYSICAL THERAPY | Facility: CLINIC | Age: 87
End: 2022-05-04
Payer: COMMERCIAL

## 2022-05-04 DIAGNOSIS — M25.561 ACUTE PAIN OF RIGHT KNEE: Primary | ICD-10-CM

## 2022-05-04 PROCEDURE — 97110 THERAPEUTIC EXERCISES: CPT | Mod: GP | Performed by: PHYSICAL THERAPIST

## 2022-05-04 NOTE — PROGRESS NOTES
Assessment/Plan:    SUBJECTIVE   Subjective: Pt reports she is feeling much better and is progressing well to date. Less pain noted. also reporting improvement in sleeping tolerance.    Current Pain level: 3/10   Changes in function:  Yes, improved knee pain with sleeping, and with general mobility throughout the day  Adverse reaction to treatment or activity:  None    OBJECTIVE  Objective: right knee AROM 5-120, non antalgic gait. improved VMO activation with SLR and seated extensions.     ASSESSMENT  Priscilla continues to require intervention to meet STG and LTG's: PT  Patient's symptoms are resolving.  Patient is progressing as expected.  Response to therapy has shown an improvement in  pain level, ROM  and function  Progress made towards STG/LTG?  Yes, improved pain with ADL's has not tried stairs or walking for 30 minutes yet, plans to this weekend    PLAN  Current treatment program is being advanced to more complex exercises.    PTA/ATC plan:  N/A    Please refer to the daily flowsheet for treatment today, total treatment time and time spent performing 1:1 timed codes.

## 2022-06-14 ENCOUNTER — ALLIED HEALTH/NURSE VISIT (OUTPATIENT)
Dept: FAMILY MEDICINE | Facility: CLINIC | Age: 87
End: 2022-06-14

## 2022-06-14 ENCOUNTER — LAB (OUTPATIENT)
Dept: LAB | Facility: CLINIC | Age: 87
End: 2022-06-14
Payer: COMMERCIAL

## 2022-06-14 ENCOUNTER — NURSE TRIAGE (OUTPATIENT)
Dept: INTERNAL MEDICINE | Facility: CLINIC | Age: 87
End: 2022-06-14

## 2022-06-14 DIAGNOSIS — N18.31 STAGE 3A CHRONIC KIDNEY DISEASE (H): Primary | ICD-10-CM

## 2022-06-14 DIAGNOSIS — M81.0 AGE-RELATED OSTEOPOROSIS WITHOUT CURRENT PATHOLOGICAL FRACTURE: ICD-10-CM

## 2022-06-14 DIAGNOSIS — N18.31 CHRONIC KIDNEY DISEASE, STAGE 3A (H): ICD-10-CM

## 2022-06-14 DIAGNOSIS — T14.8XXA BRUISE: Primary | ICD-10-CM

## 2022-06-14 LAB — CALCIUM SERPL-MCNC: 10 MG/DL (ref 8.5–10.1)

## 2022-06-14 PROCEDURE — 82310 ASSAY OF CALCIUM: CPT

## 2022-06-14 PROCEDURE — 99207 PR NO CHARGE NURSE ONLY: CPT

## 2022-06-14 PROCEDURE — 36415 COLL VENOUS BLD VENIPUNCTURE: CPT

## 2022-06-14 NOTE — TELEPHONE ENCOUNTER
Patient walked into clinic for assessment of bruise on LLE.    She states that on Sunday, she was at a [employer] game and hit her left inner leg on a railing. She reports hitting it very hard and a bruise developed. Writer visualized bruise- it is dark purple and about the size of a fist. She does report that it is improving since it first appeared, is not painful (1/10 at times but not that bad). She does not take any strong blood thinners, only daily Aspirin.     Triaged. Per protocol, patient may continue to monitor this at home and call back if worsening- home care advice provided.    She will contact clinic if needed.    JUDITH Gallegos RN  [facility], [place]  Primary Care

## 2022-06-14 NOTE — TELEPHONE ENCOUNTER
"Additional Information    Negative: Shock suspected (e.g., cold/pale/clammy skin, too weak to stand, low BP, rapid pulse)    Negative: Sounds like a life-threatening emergency to the triager    Negative: Bruises with fever    Negative: Tiny bruises (spots or dots) of unknown cause    Negative: Bruise(s) of forehead or head    Negative: Bruise(s) of face or jaw    Negative: Followed an injury, and triager doesn't know which injury guideline to use first    Negative: Post-operative bruising    Negative: Dizziness or lightheadedness    Negative: [1] Bruise on head/face, chest, or abdomen AND [2]  taking Coumadin (warfarin) or other strong blood thinner, or known bleeding disorder (e.g., thrombocytopenia)    Negative: Unexplained bleeding from another site (e.g., gums, nose, urine) as well    Negative: Patient sounds very sick or weak to the triager    Negative: [1] Not caused by an injury AND [2] 5 or more bruises now    Negative: [1] Raised bruise AND [2] size > 2 inches (5 cm) AND [3] getting bigger    Negative: [1] SEVERE pain AND [2] not improved 2 hours after pain medicine/ice packs    Negative: Suspicious history for the injury    Negative: Taking Coumadin (warfarin) or other strong blood thinner, or known bleeding disorder (e.g., thrombocytopenia)    Negative: [1] Not caused by an injury AND [2] < 5 unexplained bruises    Negative: [1] After 10 days AND [2] bruise not fading    Negative: [1] After 3 weeks AND [2] bruise still present    Negative: Minor bruising at site of heparin injection  (e.g., Heparin, Lovenox, Innohep)    Negative: Bruising easily is a chronic symptom (recurrent or ongoing AND present > 4 weeks)    Minor bruise    Answer Assessment - Initial Assessment Questions  1. APPEARANCE of BRUISE: \"Describe the bruise.\"       Purple.   2. SIZE: \"How large is the bruise?\"         3. NUMBER: \"How many bruises are there?\"       One.   4. LOCATION: \"Where is the bruise located?\"       Inner thigh, LLE. " "  5. ONSET: \"How long ago did the bruise occur?\"       Sunday.   6. CAUSE: \"Tell me how it happened.\"      Hit it on a railing at [employer].   7. MEDICAL HISTORY: \"Do you have any medical problems that can cause easy bruising or bleeding?\" (e.g., leukemia, liver disease, recent chemotherapy)      No.   8. MEDICATIONS : \"Do you take any medications which thin the blood such as: aspirin, heparin, ibuprofen (NSAIDS), Plavix, or Coumadin?\"      Aspirin.   9. OTHER SYMPTOMS: \"Do you have any other symptoms?\"  (e.g., weakness, dizziness, pain, fever, nosebleed, blood in urine/stool)      None.   10. PREGNANCY: \"Is there any chance you are pregnant?\" \"When was your last menstrual period?\"        No.    Protocols used: BRUISES-A-AH    "

## 2022-06-15 ENCOUNTER — THERAPY VISIT (OUTPATIENT)
Dept: PHYSICAL THERAPY | Facility: CLINIC | Age: 87
End: 2022-06-15
Payer: COMMERCIAL

## 2022-06-15 DIAGNOSIS — M25.561 ACUTE PAIN OF RIGHT KNEE: Primary | ICD-10-CM

## 2022-06-15 PROCEDURE — 97110 THERAPEUTIC EXERCISES: CPT | Mod: GP | Performed by: PHYSICAL THERAPIST

## 2022-06-15 NOTE — PROGRESS NOTES
Subjective:  HPI  Physical Exam                    Objective:  System    Physical Exam    General     ROS    Assessment/Plan:    DISCHARGE REPORT    Progress reporting period is from 4/20/22 to 6/15/22.       SUBJECTIVE  Subjective: Pt reports her knee is doing very well and non painful at all with dauly acitivites. occasionally some soreness but she does her exercises and it resolves.    Current Pain level: 0/10.     Initial Pain level: 2/10 (5/10 in AM).   Changes in function:  Yes (See Goal flowsheet attached for changes in current functional level)  Adverse reaction to treatment or activity: None    OBJECTIVE  Changes noted in objective findings:  Yes, increased function and less pain with prologned ambulation and navigating stairs  Objective: full AROM and 5/5 strength about right knee. good eccentric control with descending stairs.     ASSESSMENT/PLAN  Updated problem list and treatment plan: Diagnosis 1:  R knee pain, all symptoms have resolved  STG/LTGs have been met or progress has been made towards goals:  Yes (See Goal flow sheet completed today.)  Assessment of Progress: The patient's condition is improving.  Self Management Plans:  Patient has been instructed in a home treatment program.  Patient is independent in a home treatment program.  Patient  has been instructed in self management of symptoms.  Patient is independent in self management of symptoms.  I have re-evaluated this patient and find that the nature, scope, duration and intensity of the therapy is appropriate for the medical condition of the patient.  Priscilla continues to require the following intervention to meet STG and LTG's:  To continue HEP     Recommendations:  This patient is ready to be discharged from therapy and continue their home treatment program.    Please refer to the daily flowsheet for treatment today, total treatment time and time spent performing 1:1 timed codes.

## 2022-06-20 ENCOUNTER — TELEPHONE (OUTPATIENT)
Dept: FAMILY MEDICINE | Facility: CLINIC | Age: 87
End: 2022-06-20

## 2022-06-20 ENCOUNTER — ALLIED HEALTH/NURSE VISIT (OUTPATIENT)
Dept: FAMILY MEDICINE | Facility: CLINIC | Age: 87
End: 2022-06-20
Payer: COMMERCIAL

## 2022-06-20 DIAGNOSIS — M81.0 OSTEOPOROSIS WITHOUT CURRENT PATHOLOGICAL FRACTURE: ICD-10-CM

## 2022-06-20 DIAGNOSIS — M81.0 AGE RELATED OSTEOPOROSIS: Primary | ICD-10-CM

## 2022-06-20 DIAGNOSIS — N18.30 CHRONIC KIDNEY DISEASE, STAGE 3 (H): ICD-10-CM

## 2022-06-20 DIAGNOSIS — M81.0 AGE-RELATED OSTEOPOROSIS WITHOUT CURRENT PATHOLOGICAL FRACTURE: Primary | ICD-10-CM

## 2022-06-20 PROCEDURE — 99207 PR NO CHARGE NURSE ONLY: CPT

## 2022-06-20 PROCEDURE — 96372 THER/PROPH/DIAG INJ SC/IM: CPT | Performed by: INTERNAL MEDICINE

## 2022-06-20 NOTE — PATIENT INSTRUCTIONS
You received your Prolia injection today  Your next Injection is due in 6 months (around 12/20/2022)  If you plan on having any dental work done within the next 6 months, please let your dentist know that you are on this medication.  We will call in advance to have your next injection scheduled.   Make sure you do not have any dental work completed involving the jaw bone within 1 month prior to your scheduled injection

## 2022-06-20 NOTE — PROGRESS NOTES
Clinic Administered Medication Documentation    Administrations This Visit     denosumab (PROLIA) injection 60 mg     Admin Date  06/20/2022 Action  Given Dose  60 mg Route  Subcutaneous Site  Left Arm Administered By  Siobhan Morales RN    Ordering Provider: Laura Anders MD    Patient Supplied?: No                  Prolia Documentation    Prior to injection, verified patient identity using patient's name and date of birth. Medication was administered. Please see MAR and medication order for additional information. Patient instructed to remain in clinic for 15 minutes and report any adverse reaction to staff immediately .    Indication: Prolia  (denosumab) is a prescription medicine used to treat osteoporosis in patients who:     Are at high risk for fracture, meaning patients who have had a fracture related to osteoporosis, or who have multiple risk factors for fracture.    Cannot use another osteoporosis medicine or other osteoporosis medicines did not work well.    The timeline for early/late injections would be 4 weeks early and any time after the 6 month dottie. If a patient receives their injection late, then the subsequent injection would be 6 months from the date that they actually received the injection.    When was the last injection?  10/28/21  Was the last injection at least 6 months ago? Yes  Has the prior authorization been completed?  Yes  Is there an active order (within the past 365 days) in the chart?  Yes  Patient denied having dental work involving the bone in the past 6 months?  No.  Do not administer.  Inform provider.   Provider was notified, please see note started 10/28/21    Siobhan RODRIGUEZ RN  [facility]

## 2022-06-20 NOTE — TELEPHONE ENCOUNTER
Postponing this encounter-    Patient due for Prolia injection around 12/20/2022    1. RN- Please ask provider to put in a new CAM order for (Pre Auth) Prolia injection if one is not current for the calendar year and any lab orders if needed (it is up to the provider's discretion on how often labs are checked once Prolia injections have been started)    2. Team- Please contact patient to schedule the injection with RN 2 weeks out    Make sure patient has NOT had any dental work involving the jaw bone (i.e teeth extraction) 1 month prior to injection.   Schedule 1 month out from any dental procedure involving the jaw bone.    For questions about the cost, patient may contact our CONSUMER PRICE LINE at [phone] for an estimate.     CHECK STATUS PRIOR TO ADMINISTRATION- Chart Review > Referrals tab - Select the Referral to view the report    Mary Spears RN  [facility]

## 2022-08-05 PROBLEM — M25.561 ACUTE PAIN OF RIGHT KNEE: Status: RESOLVED | Noted: 2022-04-20 | Resolved: 2022-08-05

## 2022-09-13 ENCOUNTER — TRANSFERRED RECORDS (OUTPATIENT)
Dept: HEALTH INFORMATION MANAGEMENT | Facility: CLINIC | Age: 87
End: 2022-09-13

## 2022-09-13 LAB — RETINOPATHY: NORMAL

## 2022-10-18 DIAGNOSIS — N18.31 STAGE 3A CHRONIC KIDNEY DISEASE (H): ICD-10-CM

## 2022-10-18 DIAGNOSIS — M81.0 AGE-RELATED OSTEOPOROSIS WITHOUT CURRENT PATHOLOGICAL FRACTURE: Primary | ICD-10-CM

## 2022-10-18 RX ORDER — METHYLPREDNISOLONE SODIUM SUCCINATE 125 MG/2ML
125 INJECTION, POWDER, LYOPHILIZED, FOR SOLUTION INTRAMUSCULAR; INTRAVENOUS
Status: CANCELLED
Start: 2022-10-18

## 2022-10-18 RX ORDER — ALBUTEROL SULFATE 0.83 MG/ML
2.5 SOLUTION RESPIRATORY (INHALATION)
Status: CANCELLED | OUTPATIENT
Start: 2022-10-18

## 2022-10-18 RX ORDER — NALOXONE HYDROCHLORIDE 0.4 MG/ML
0.2 INJECTION, SOLUTION INTRAMUSCULAR; INTRAVENOUS; SUBCUTANEOUS
Status: CANCELLED | OUTPATIENT
Start: 2022-10-18

## 2022-10-18 RX ORDER — MEPERIDINE HYDROCHLORIDE 25 MG/ML
25 INJECTION INTRAMUSCULAR; INTRAVENOUS; SUBCUTANEOUS EVERY 30 MIN PRN
Status: CANCELLED | OUTPATIENT
Start: 2022-10-18

## 2022-10-18 RX ORDER — EPINEPHRINE 1 MG/ML
0.3 INJECTION, SOLUTION, CONCENTRATE INTRAVENOUS EVERY 5 MIN PRN
Status: CANCELLED | OUTPATIENT
Start: 2022-10-18

## 2022-10-18 RX ORDER — DIPHENHYDRAMINE HYDROCHLORIDE 50 MG/ML
50 INJECTION INTRAMUSCULAR; INTRAVENOUS
Status: CANCELLED
Start: 2022-10-18

## 2022-10-18 RX ORDER — ALBUTEROL SULFATE 90 UG/1
1-2 AEROSOL, METERED RESPIRATORY (INHALATION)
Status: CANCELLED
Start: 2022-10-18

## 2022-11-01 ENCOUNTER — LAB (OUTPATIENT)
Dept: LAB | Facility: CLINIC | Age: 87
End: 2022-11-01
Payer: COMMERCIAL

## 2022-11-01 DIAGNOSIS — Z13.220 SCREENING FOR HYPERLIPIDEMIA: ICD-10-CM

## 2022-11-01 DIAGNOSIS — N18.31 STAGE 3A CHRONIC KIDNEY DISEASE (H): ICD-10-CM

## 2022-11-01 LAB
ANION GAP SERPL CALCULATED.3IONS-SCNC: 4 MMOL/L (ref 3–14)
BUN SERPL-MCNC: 20 MG/DL (ref 7–30)
CALCIUM SERPL-MCNC: 9.3 MG/DL (ref 8.5–10.1)
CHLORIDE BLD-SCNC: 108 MMOL/L (ref 94–109)
CHOLEST SERPL-MCNC: 204 MG/DL
CO2 SERPL-SCNC: 28 MMOL/L (ref 20–32)
CREAT SERPL-MCNC: 0.94 MG/DL (ref 0.52–1.04)
FASTING STATUS PATIENT QL REPORTED: YES
GFR SERPL CREATININE-BSD FRML MDRD: 59 ML/MIN/1.73M2
GLUCOSE BLD-MCNC: 98 MG/DL (ref 70–99)
HDLC SERPL-MCNC: 82 MG/DL
HGB BLD-MCNC: 13.9 G/DL (ref 11.7–15.7)
LDLC SERPL CALC-MCNC: 103 MG/DL
NONHDLC SERPL-MCNC: 122 MG/DL
POTASSIUM BLD-SCNC: 4.5 MMOL/L (ref 3.4–5.3)
SODIUM SERPL-SCNC: 140 MMOL/L (ref 133–144)
TRIGL SERPL-MCNC: 95 MG/DL

## 2022-11-01 PROCEDURE — 80061 LIPID PANEL: CPT

## 2022-11-01 PROCEDURE — 85018 HEMOGLOBIN: CPT

## 2022-11-01 PROCEDURE — 36415 COLL VENOUS BLD VENIPUNCTURE: CPT

## 2022-11-01 PROCEDURE — 80048 BASIC METABOLIC PNL TOTAL CA: CPT

## 2022-11-02 NOTE — RESULT ENCOUNTER NOTE
Priscilla Shaw    Electrolytes, kidney function, blood count and cholesterol look great.  See you soon!    Sincerely,       VICKIE CRUZ M.D.

## 2022-11-07 ENCOUNTER — TELEPHONE (OUTPATIENT)
Dept: INTERNAL MEDICINE | Facility: CLINIC | Age: 87
End: 2022-11-07

## 2022-11-07 ENCOUNTER — OFFICE VISIT (OUTPATIENT)
Dept: INTERNAL MEDICINE | Facility: CLINIC | Age: 87
End: 2022-11-07
Payer: COMMERCIAL

## 2022-11-07 VITALS
BODY MASS INDEX: 25.91 KG/M2 | TEMPERATURE: 97.5 F | OXYGEN SATURATION: 99 % | WEIGHT: 132 LBS | DIASTOLIC BLOOD PRESSURE: 80 MMHG | HEIGHT: 60 IN | HEART RATE: 71 BPM | SYSTOLIC BLOOD PRESSURE: 129 MMHG

## 2022-11-07 DIAGNOSIS — Z78.0 ASYMPTOMATIC POSTMENOPAUSAL STATUS: ICD-10-CM

## 2022-11-07 DIAGNOSIS — I10 HYPERTENSION GOAL BP (BLOOD PRESSURE) < 140/90: ICD-10-CM

## 2022-11-07 DIAGNOSIS — Z00.01 ENCOUNTER FOR GENERAL ADULT MEDICAL EXAMINATION WITH ABNORMAL FINDINGS: Primary | ICD-10-CM

## 2022-11-07 DIAGNOSIS — N18.31 STAGE 3A CHRONIC KIDNEY DISEASE (H): ICD-10-CM

## 2022-11-07 DIAGNOSIS — R01.1 HEART MURMUR: ICD-10-CM

## 2022-11-07 DIAGNOSIS — M81.0 AGE-RELATED OSTEOPOROSIS WITHOUT CURRENT PATHOLOGICAL FRACTURE: ICD-10-CM

## 2022-11-07 PROCEDURE — G0439 PPPS, SUBSEQ VISIT: HCPCS | Performed by: INTERNAL MEDICINE

## 2022-11-07 RX ORDER — AMLODIPINE BESYLATE 5 MG/1
5 TABLET ORAL DAILY
Qty: 90 TABLET | Refills: 3 | Status: SHIPPED | OUTPATIENT
Start: 2022-11-07 | End: 2024-01-15

## 2022-11-07 ASSESSMENT — ACTIVITIES OF DAILY LIVING (ADL): CURRENT_FUNCTION: NO ASSISTANCE NEEDED

## 2022-11-07 NOTE — Clinical Note
Ensure she has her Prolia injection scheduled.  She thinks she has it scheduled for sometime in December, but I can't see it in Epic.  If not scheduled yet, please call her and ensure it is on her calender.  Thank you!!

## 2022-11-07 NOTE — TELEPHONE ENCOUNTER
Note that patient last received Prolia 06/20. Would not be due till around 12/20. An encounter was already started and postponed to address when patient is due. No further action required at this time. Patient notified and verbalized understanding.    Laura Anders MD  [facility] Triage Pool  Ensure she has her Prolia injection scheduled.  She thinks she has it scheduled for sometime in December, but I can't see it in Epic.  If not scheduled yet, please call her and ensure it is on her calender.  Thank you!!     Vianney Gramajo RN   [facility]

## 2022-11-07 NOTE — Clinical Note
Please abstract the following data from this visit with this patient into the appropriate field in Epic:  Tests that can be patient reported without a hard copy:  Eye exam with ophthalmology on this date: 9/13/22  see care everywhere    Other Tests found in the patient's chart through Chart Review/Care Everywhere:  Eye exam with ophthalmology on this date: 9/13/22   see care everywhere  Note to [employer]straction: If this section is blank, no results were found via Chart Review/Care Everywhere.     negative...

## 2022-11-07 NOTE — PROGRESS NOTES
"SUBJECTIVE:   Naga is a 87 year old who presents for Preventive Visit.    86 y/o F here for AFE.  H/o  osteoporosis (Prolia, due 12/22, has appt???), HTN, CKD 3a.   Recent BMP, FLP, CBC all look great.        Lives in apartment, in [place].   Lives alone.     Her sister lives one floor above her    Her son lives in [facility].          Are you in the first 12 months of your Medicare coverage?  No    Healthy Habits:    In general, how would you rate your overall health?  Very good    Frequency of exercise:  2-3 days/week    Duration of exercise:  15-30 minutes    Do you usually eat at least 4 servings of fruit and vegetables a day, include whole grains    & fiber and avoid regularly eating high fat or \"junk\" foods?  Yes    Taking medications regularly:  Yes    Barriers to taking medications:  None    Medication side effects:  None    Ability to successfully perform activities of daily living:  No assistance needed    Home Safety:  No safety concerns identified    Hearing Impairment:  No hearing concerns    In the past 6 months, have you been bothered by leaking of urine?  No    In general, how would you rate your overall mental or emotional health?  Very good      PHQ-2 Total Score:    Additional concerns today:  No    Do you feel safe in your environment? Yes    Have you ever done Advance Care Planning? (For example, a Health Directive, POLST, or a discussion with a medical provider or your loved ones about your wishes): Yes, advance care planning is on file.       Fall risk  Fallen 2 or more times in the past year?: No  Any fall with injury in the past year?: No    Cognitive Screening   1) Repeat 3 items (Leader, Season, Table)    2) Clock draw: NORMAL  3) 3 item recall: Recalls 3 objects  Results: 3 items recalled: COGNITIVE IMPAIRMENT LESS LIKELY    Mini-CogTM Copyright S David. Licensed by the author for use in [employer]; reprinted with permission (jasbir@.edu). All rights reserved.  "     Do you have sleep apnea, excessive snoring or daytime drowsiness?: no    Reviewed and updated as needed this visit by clinical staff   Tobacco  Allergies  Meds   Med Hx  Surg Hx  Fam Hx  Soc Hx        Reviewed and updated as needed this visit by Provider                 Social History     Tobacco Use     Smoking status: Former     Packs/day: 0.00     Years: 50.00     Pack years: 0.00     Types: Cigarettes     Start date: 7/15/1957     Quit date: 2009     Years since quittin.1     Smokeless tobacco: Never     Tobacco comments:     I have no current problems due to smoking   Substance Use Topics     Alcohol use: Yes     Comment: 1 glass of wine per day     If you drink alcohol do you typically have >3 drinks per day or >7 drinks per week? No    Alcohol Use 10/19/2021   Prescreen: >3 drinks/day or >7 drinks/week? -   Prescreen: >3 drinks/day or >7 drinks/week? No           No Concerns    Current providers sharing in care for this patient include:   Patient Care Team:  Laura Anders MD as PCP - General (Internal Medicine)  Laura Anders MD as Assigned PCP  Mathew Keller DO as Assigned Musculoskeletal Provider    The following health maintenance items are reviewed in Epic and correct as of today:  Health Maintenance   Topic Date Due     HEPATITIS B IMMUNIZATION (1 of 3 - 3-dose series) Never done     URINALYSIS  Never done     MICROALBUMIN  2019     EYE EXAM  2021     PHQ-2 (once per calendar year)  2022     DEXA  2022     ANNUAL REVIEW OF HM ORDERS  10/19/2022     FALL RISK ASSESSMENT  10/19/2022     MEDICARE ANNUAL WELLNESS VISIT  10/19/2022     DTAP/TDAP/TD IMMUNIZATION (3 - Td or Tdap) 2023     BMP  2023     LIPID  2023     HEMOGLOBIN  2023     COLORECTAL CANCER SCREENING  2024     ADVANCE CARE PLANNING  10/19/2026     INFLUENZA VACCINE  Completed     Pneumococcal Vaccine: 65+ Years  Completed     ZOSTER IMMUNIZATION   Completed     COVID-19 Vaccine  Completed     IPV IMMUNIZATION  Aged Out     MENINGITIS IMMUNIZATION  Aged Out     Labs reviewed in EPIC  BP Readings from Last 3 Encounters:   22 129/80   22 132/70   10/19/21 120/80    Wt Readings from Last 3 Encounters:   22 59.9 kg (132 lb)   22 58.1 kg (128 lb)   10/19/21 58.1 kg (128 lb)                  Patient Active Problem List   Diagnosis     CARDIOVASCULAR SCREENING; LDL GOAL LESS THAN 130     Hypertension goal BP (blood pressure) < 140/90     Osteoporosis     Atrophy, vulva     Health Care Home     Advance care planning     Vertigo     Mastodynia     Cataract     CKD (chronic kidney disease) stage 3, GFR 30-59 ml/min (H)     Stage 3a chronic kidney disease (H)     Past Surgical History:   Procedure Laterality Date     APPENDECTOMY       COLONOSCOPY  2014     HYSTERECTOMY, BERNADETTE         Social History     Tobacco Use     Smoking status: Former     Packs/day: 0.00     Years: 50.00     Pack years: 0.00     Types: Cigarettes     Start date: 7/15/1957     Quit date: 2009     Years since quittin.1     Smokeless tobacco: Never     Tobacco comments:     I have no current problems due to smoking   Substance Use Topics     Alcohol use: Yes     Comment: 1 glass of wine per day     Family History   Problem Relation Age of Onset     Hypertension Mother      Cerebrovascular Disease Mother      Heart Disease Sister         pacemaker      Hypertension Sister      Osteoporosis Sister      Heart Disease Son      Heart Disease Sister      Osteoporosis Sister      Prostate Cancer Father          Current Outpatient Medications   Medication Sig Dispense Refill     amLODIPine (NORVASC) 5 MG tablet Take 1 tablet (5 mg) by mouth daily 90 tablet 3     aspirin 81 MG tablet Take 1 tablet by mouth daily.       COD LIVER OIL OR CAPS 1,000mg       denosumab (PROLIA) 60 MG/ML SOSY injection Inject 1 mL (60 mg) Subcutaneous every 6 months 1 mL 1     magnesium  "chloride 535 (64 Mg) MG TBEC CR tablet Take 535 mg by mouth daily       Multiple Vitamins-Minerals (CENTRUM SILVER) per tablet Take 1 tablet by mouth daily. 1 tablet       SUPER B COMPLEX OR TABS 1 daily        VIACTIV 500-500-40 MG-UNT-MCG OR CHEW 1 daily       VITAMIN D 400 UNIT OR TABS 1daily       Allergies   Allergen Reactions     Fosamax [Alendronic Acid] Other (See Comments)     Stomach pain     Recent Labs   Lab Test 11/01/22  0820 10/12/21  0820 10/08/20  0748 08/09/19  0838 08/03/18  0806   * 81  --   --  88   HDL 82 76  --   --  73   TRIG 95 141  --   --  134   CR 0.94 1.05* 0.92 0.96 0.89   GFRESTIMATED 59* 49* 58* 55* 61   GFRESTBLACK  --   --  67 64 73   POTASSIUM 4.5 4.2 4.1 4.0 4.2   TSH  --   --   --   --  1.52           Mammogram Screening - Patient over age 75, has elected to continue with screening.  Pertinent mammograms are reviewed under the imaging tab.    Review of Systems  Constitutional, HEENT, cardiovascular, pulmonary, gi and gu systems are negative, except as otherwise noted.    OBJECTIVE:   /80 (BP Location: Right arm, Patient Position: Sitting, Cuff Size: Adult Regular)   Pulse 71   Temp 97.5  F (36.4  C) (Oral)   Ht 1.518 m (4' 11.75\")   Wt 59.9 kg (132 lb)   SpO2 99%   BMI 26.00 kg/m   Estimated body mass index is 26 kg/m  as calculated from the following:    Height as of this encounter: 1.518 m (4' 11.75\").    Weight as of this encounter: 59.9 kg (132 lb).  Physical Exam  GENERAL APPEARANCE: healthy, alert and no distress  EYES: Eyes grossly normal to inspection, PERRL and conjunctivae and sclerae normal  HENT: ear canals and TM's normal, nose and mouth without ulcers or lesions, oropharynx clear and oral mucous membranes moist  NECK: no adenopathy, no asymmetry, masses, or scars and thyroid normal to palpation  RESP: lungs clear to auscultation - no rales, rhonchi or wheezes  BREAST: normal without masses, tenderness or nipple discharge and no palpable axillary " "masses or adenopathy  CV: regular rate and rhythm, normal S1 S2, no S3 or S4, 2/4 systolic murmur throughout precordium, no  click or rub, no peripheral edema and peripheral pulses strong  ABDOMEN: soft, nontender, no hepatosplenomegaly, no masses and bowel sounds normal   (female): deferred   MS: no musculoskeletal defects are noted and gait is age appropriate without ataxia  SKIN: no suspicious lesions or rashes  NEURO: Normal strength and tone, sensory exam grossly normal, mentation intact and speech normal  PSYCH: mentation appears normal and affect normal/bright    Diagnostic Test Results:  Labs reviewed in Epic with patient face to face     ASSESSMENT / PLAN:       (Z00.01) Encounter for general adult medical examination with abnormal findings  (primary encounter diagnosis)  Comment: doing well    In great health   Plan:      (N18.31) Stage 3a chronic kidney disease (H)  Comment:  Borderline    Excellent age-appropriate GFR   Plan:      (I10) Hypertension goal BP (blood pressure) < 140/90  Comment:  Controlled on norvasc  Plan: amLODIPine (NORVASC) 5 MG tablet, OFFICE/OUTPT         VISIT,EST,LEVL II        Should check at home, too     (R01.1) Heart murmur  Comment:  Noted on exam today.   Echo 3Y ago during syncope w/o showed \"thickened\" Ao valve.  Difficult visualization per report.     Plan: Echocardiogram Complete, OFFICE/OUTPT         VISIT,EST,LEVL II        Will repeat echo to re-characterize murmur     (M81.0) Age-related osteoporosis without current pathological fracture  Comment: prolia is due.  I think the order is in.   Plan: OFFICE/OUTPT VISIT,EST,LEVL II        Med management.     (Z78.0) Asymptomatic postmenopausal status  Comment:    Plan: DEXA HIP/PELVIS/SPINE - Future         Due to recheck Dexa.             COUNSELING:  Reviewed preventive health counseling, as reflected in patient instructions       Regular exercise       Healthy diet/nutrition    Estimated body mass index is 26 kg/m  as " "calculated from the following:    Height as of this encounter: 1.518 m (4' 11.75\").    Weight as of this encounter: 59.9 kg (132 lb).        She reports that she quit smoking about 13 years ago. Her smoking use included cigarettes. She started smoking about 65 years ago. She has never used smokeless tobacco.      Appropriate preventive services were discussed with this patient, including applicable screening as appropriate for cardiovascular disease, diabetes, osteopenia/osteoporosis, and glaucoma.  As appropriate for age/gender, discussed screening for colorectal cancer, prostate cancer, breast cancer, and cervical cancer. Checklist reviewing preventive services available has been given to the patient.    Reviewed patients plan of care and provided an AVS. The Basic Care Plan (routine screening as documented in Health Maintenance) for Priscilla meets the Care Plan requirement. This Care Plan has been established and reviewed with the Patient.    Counseling Resources:  ATP IV Guidelines  Pooled Cohorts Equation Calculator  Breast Cancer Risk Calculator  Breast Cancer: Medication to Reduce Risk  FRAX Risk Assessment  ICSI Preventive Guidelines  Dietary Guidelines for Americans, 2010  USDA's MyPlate  ASA Prophylaxis  Lung CA Screening    Laura Anders MD  [facility]    Identified Health Risks:  "

## 2022-11-07 NOTE — PATIENT INSTRUCTIONS
Call to schedule imaging  OR 1 [phone]:  You are eligible to recheck Bone Density AND check echocardogram    Message sent to RN to ensure PRolia is on your calendar.     Home BP:   goal BP is less than 140/90  ([place] for example)

## 2022-12-01 NOTE — TELEPHONE ENCOUNTER
LM for patient to return call.    Lab appointment is needed about a week prior to the prolia injection.  Injection not due until 12- or later.    If patient calls back, schedule her for a lab appointment and then the injection about a week later.    Make sure she has not had any dental work, tooth extractions or jaw bone surgery within the past month.    Jessy Chow,

## 2022-12-01 NOTE — TELEPHONE ENCOUNTER
CAM order and insurance verification both current.  Routing to provider to please advise if any labs are needed prior to next prolia injection.  If so, please order labs then route back to team to schedule patient    Mary Spears RN  [facility]

## 2022-12-06 NOTE — TELEPHONE ENCOUNTER
Patient was scheduled incorrectly on the MA schedule.     Patient called.  Has not had any dental work, extractions or jaw bone surgery within the past month.    Patient scheduled for lab appointment on 12-, at 9:15 a.m.    Patient scheduled to have prolia injection on 12-, at 11 a.m.    Jessy Chow,

## 2022-12-27 ENCOUNTER — LAB (OUTPATIENT)
Dept: LAB | Facility: CLINIC | Age: 87
End: 2022-12-27
Payer: COMMERCIAL

## 2022-12-27 DIAGNOSIS — N18.31 CHRONIC KIDNEY DISEASE, STAGE 3A (H): ICD-10-CM

## 2022-12-27 DIAGNOSIS — M81.0 AGE-RELATED OSTEOPOROSIS WITHOUT CURRENT PATHOLOGICAL FRACTURE: ICD-10-CM

## 2022-12-27 LAB
ALBUMIN SERPL-MCNC: 4.3 G/DL (ref 3.4–5)
CALCIUM SERPL-MCNC: 9.9 MG/DL (ref 8.5–10.1)
CREAT SERPL-MCNC: 0.88 MG/DL (ref 0.52–1.04)
GFR SERPL CREATININE-BSD FRML MDRD: 64 ML/MIN/1.73M2
MAGNESIUM SERPL-MCNC: 1.8 MG/DL (ref 1.6–2.3)
PHOSPHATE SERPL-MCNC: 3.6 MG/DL (ref 2.5–4.5)

## 2022-12-27 PROCEDURE — 84100 ASSAY OF PHOSPHORUS: CPT

## 2022-12-27 PROCEDURE — 82040 ASSAY OF SERUM ALBUMIN: CPT

## 2022-12-27 PROCEDURE — 82565 ASSAY OF CREATININE: CPT

## 2022-12-27 PROCEDURE — 82310 ASSAY OF CALCIUM: CPT

## 2022-12-27 PROCEDURE — 36415 COLL VENOUS BLD VENIPUNCTURE: CPT

## 2022-12-27 PROCEDURE — 83735 ASSAY OF MAGNESIUM: CPT

## 2022-12-29 ENCOUNTER — ALLIED HEALTH/NURSE VISIT (OUTPATIENT)
Dept: FAMILY MEDICINE | Facility: CLINIC | Age: 87
End: 2022-12-29
Payer: COMMERCIAL

## 2022-12-29 DIAGNOSIS — M81.0 AGE-RELATED OSTEOPOROSIS WITHOUT CURRENT PATHOLOGICAL FRACTURE: Primary | ICD-10-CM

## 2022-12-29 DIAGNOSIS — N18.31 STAGE 3A CHRONIC KIDNEY DISEASE (H): ICD-10-CM

## 2022-12-29 PROCEDURE — 96372 THER/PROPH/DIAG INJ SC/IM: CPT | Performed by: INTERNAL MEDICINE

## 2022-12-29 PROCEDURE — 99207 PR NO CHARGE NURSE ONLY: CPT

## 2022-12-29 NOTE — PATIENT INSTRUCTIONS
You received your Prolia injection today  Your next Injection is due in 6 months (around 6/29/2023)  If you plan on having any dental work done within the next 6 months, please let your dentist know that you are on this medication.  We will call in advance to have your next injection scheduled.   Make sure you do not have any dental work completed involving the jaw bone within 1 month prior to your scheduled injection

## 2022-12-29 NOTE — PROGRESS NOTES
Clinic Administered Medication Documentation    Administrations This Visit     denosumab (PROLIA) injection 60 mg     Admin Date  12/29/2022 Action  Given Dose  60 mg Route  Subcutaneous Site  Left Arm Administered By  Mary Spears RN    Ordering Provider: Laura Anders MD    NDC: 40758-284-11    Lot#: [id number]    : AMGEN    Patient Supplied?: No                  Prolia Documentation    Prior to injection, verified patient identity using patient's name and date of birth. Medication was administered. Please see MAR and medication order for additional information. Patient instructed to stay in clinic after the injection but patient declined.    Indication: Prolia  (denosumab) is a prescription medicine used to treat osteoporosis in patients who:     Are at high risk for fracture, meaning patients who have had a fracture related to osteoporosis, or who have multiple risk factors for fracture.    Cannot use another osteoporosis medicine or other osteoporosis medicines did not work well.    The timeline for early/late injections would be 4 weeks early and any time after the 6 month dottie. If a patient receives their injection late, then the subsequent injection would be 6 months from the date that they actually received the injection.    When was the last injection?  6/20/2022  Was the last injection at least 6 months ago? Yes  Has the prior authorization been completed?  Yes  Is there an active order (within the past 365 days) in the chart?  Yes  Patient denies any dental work involving the bone (e.g. tooth extraction or dental implants) in the past 4 weeks?  Yes  Patient denies plans for any dental work involving the bone (e.g. tooth extraction or dental implants) in the next 4 weeks? Yes    The following steps were completed to comply with the REMS program for Prolia:    Reviewed information in the Medication Guide and Patient Counseling Chart, including the serious risks of Prolia  and the  symptoms of each risk.    Advised patient to seek prompt medical attention if they have signs or symptoms of any of the serious risks.    Provided each patient a copy of the Medication Guide and Patient Brochure.      Was entire vial of medication used? Yes  Vial/Syringe: Syringe  Expiration Date:  3/31/2024  Was this medication supplied by the patient? No    Mary Spears RN  [facility]

## 2023-02-06 ENCOUNTER — TELEPHONE (OUTPATIENT)
Dept: FAMILY MEDICINE | Facility: CLINIC | Age: 88
End: 2023-02-06
Payer: COMMERCIAL

## 2023-02-06 NOTE — TELEPHONE ENCOUNTER
Received phone call from Hermila at [facility]. Would like additional information regarding patient's prolia injections. Please call back insurance at [phone].

## 2023-03-07 ENCOUNTER — ANCILLARY PROCEDURE (OUTPATIENT)
Dept: CARDIOLOGY | Facility: CLINIC | Age: 88
End: 2023-03-07
Attending: INTERNAL MEDICINE
Payer: COMMERCIAL

## 2023-03-07 ENCOUNTER — ANCILLARY ORDERS (OUTPATIENT)
Dept: INTERNAL MEDICINE | Facility: CLINIC | Age: 88
End: 2023-03-07

## 2023-03-07 ENCOUNTER — ANCILLARY PROCEDURE (OUTPATIENT)
Dept: BONE DENSITY | Facility: CLINIC | Age: 88
End: 2023-03-07
Attending: INTERNAL MEDICINE
Payer: COMMERCIAL

## 2023-03-07 DIAGNOSIS — Z78.0 ASYMPTOMATIC POSTMENOPAUSAL STATUS: ICD-10-CM

## 2023-03-07 DIAGNOSIS — R01.1 HEART MURMUR: ICD-10-CM

## 2023-03-07 LAB — LVEF ECHO: NORMAL

## 2023-03-07 PROCEDURE — 77085 DXA BONE DENSITY AXL VRT FX: CPT | Performed by: INTERNAL MEDICINE

## 2023-03-07 PROCEDURE — 93306 TTE W/DOPPLER COMPLETE: CPT | Performed by: STUDENT IN AN ORGANIZED HEALTH CARE EDUCATION/TRAINING PROGRAM

## 2023-03-07 NOTE — LETTER
2023      Naga Shaw  [address]   [place] [place] [address]      Dear Naga:    We are writing to inform you of your test results.    The bone density shows some bone loss, but NOT osteoporosis.    Continue daily activity/exercise.   Daily vitamin D.       Recheck the bone density in about 3 years.     Resulted Orders   DX Hip/Pelvis/Spine w Lat Fraction Blank    Narrative    BONE DENSITOMETRY  [place] [id number] [facility]  [place] [id number]  3/7/2023      PATIENT: Priscilla Shaw  CHART: [id number]   :  1935  AGE:  87 year old  SEX:  female   REFERRING PROVIDER:  Laura Anders MD     PROCEDURE:  Bone density scanning was performed using DXA technology of   the lumbar spine and hip.  Scanning was performed on a Lunar Prodigy   scanner.  Reporting is completed in the form of a T-score.  The T-score   represents the standard deviation from peak bone mass based on a young   healthy adult.     REFERENCE T-SCORES:       Normal                -1.0 and greater                                 Osteopenia         Between -1.0 and -2.5                                             Osteoporosis     -2.5 and less                                       RISK FACTORS:  Post-menopausal, Height loss of 1.5 inches, Parent history   of osteoporosis, Parent history of a hip fracture, Fracture of wrist,   follow up Low Bone Density (osteopenia)    CURRENT TREATMENT:  Calcium, Prolia (denosumab), Vitamin D     FINDINGS:               Lumbar Spine (L1-L2)      T-score:  -2.0, marked degenerative   changes present at L3,4, so only L1,2 are evaluated.                Left Femoral Neck            T-score:  -1.6               Right Femoral Neck          T-score:  -1.8                           Lumbar (L1-L2) BMD: 0.923  Previous: 0.928                          Total Hip Mean BMD: 0.777  Previous: 0.788     Comparison is made to another DXA performed on the same Lunar Prodigy    machine on  "2/21/2020.      LATERAL VERTEBRAL ASSESSMENT  Procedure:  Vertebral fracture assessment was performed in the lateral   decubitus position using a Socrata Prodigy  densitometer.  Indications for VFA: T-score of -1.0 or worse, age (female>69) and height   loss > 1.5\"  Confounding factors for VFA: None.  The LVA scan is interpretable from T7   to L5.    VFA Findings: Using the semi-quantitative analysis of Genant there was   evidence of no spinal deformity  VFA Impression: Priscilla Shaw has no vertebral fractures identified on   the VFA.     IMPRESSION  Low Bone Density (osteopenia).  Degenerative changes at the lumbar spine   may falsely elevate results.     There has been no significant change in bone density of the lumbar spine.   There has been no significant change in bone density of the hip(s).     Recommendations include ensuring adequate Calcium and Vitamin D.    Follow up can be considered in 3 years.   ___________________  Zuleyka Soler M.D.  Electronically signed         If you have any questions or concerns, please call the clinic at the number listed above.     Sincerely,      Laura Anders MD/lacy      "

## 2023-03-09 NOTE — RESULT ENCOUNTER NOTE
Priscilla Shaw    Structure and function of your heart is normal.   Due to age, some calcium deposits have built up on the heart valves.   For that reason, a flow murmur was heard during your routing exam.  T  his is a physiologic murmur due to age - appropriate heart changes.  It is not a pathology we need to be concerned about.   No action is needed unless a new concern about your heart arises in the future    Thank you for completing this study!    Best,       VICKIE CRUZ M.D.

## 2023-03-13 NOTE — RESULT ENCOUNTER NOTE
Priscilla Shaw    The bone density shows some bone loss, but NOT osteoporosis.    Continue daily activity/exercise.   Daily vitamin D.       Recheck the bone density in about 3 years.     VICKIE Dudley M.D.

## 2023-06-09 NOTE — TELEPHONE ENCOUNTER
Patient called to schedule her next prolia injection.      Routing to provider to place CAM order if needed and advise if any labs are needed prior to injection.    Jessy Chow,

## 2023-06-12 ENCOUNTER — TELEPHONE (OUTPATIENT)
Dept: INTERNAL MEDICINE | Facility: CLINIC | Age: 88
End: 2023-06-12
Payer: COMMERCIAL

## 2023-06-12 DIAGNOSIS — M81.0 AGE-RELATED OSTEOPOROSIS WITHOUT CURRENT PATHOLOGICAL FRACTURE: Primary | ICD-10-CM

## 2023-06-12 DIAGNOSIS — N18.31 STAGE 3A CHRONIC KIDNEY DISEASE (H): ICD-10-CM

## 2023-06-13 ENCOUNTER — TELEPHONE (OUTPATIENT)
Dept: FAMILY MEDICINE | Facility: CLINIC | Age: 88
End: 2023-06-13
Payer: COMMERCIAL

## 2023-06-13 NOTE — TELEPHONE ENCOUNTER
Reason for Call:  Appointment Request    Patient requesting this type of appt: Chronic Diease Management/Medication/Follow-Up    Requested provider: Laura Anders    Reason patient unable to be scheduled: Needs to be scheduled by clinic    When does patient want to be seen/preferred time: 3-7 days    Comments: Prolia injection, spoke to the nurse last week and has not heard anything back about it.     Could we send this information to you in [place] or would you prefer to receive a phone call?:   Patient would prefer a phone call   Okay to leave a detailed message?: Yes at Home number on file [phone] (home)    Call taken on 6/13/2023 at 1:42 PM by Calista Schneider

## 2023-06-14 NOTE — TELEPHONE ENCOUNTER
Please schedule patient for an appointment for prolia injection with RN- due 6/29/2023    Make sure patient has NOT had any dental work involving the jaw bone (i.e teeth extraction) 1 month prior to injection.   Schedule 1 month out from any dental procedure involving the jaw bone.     For questions about the cost, patient may contact our CONSUMER PRICE LINE at [phone] for an estimate.      CHECK STATUS PRIOR TO ADMINISTRATION- Chart Review > Referrals tab - Select the Referral to view the report    Mary Spears RN  [facility]

## 2023-06-14 NOTE — TELEPHONE ENCOUNTER
Patient called.      Prolia injection scheduled for Friday, June 30th at 11 a.m.    Patient has not had any dental work including any extractions or jaw bone surgery within the past month.     Jessy Chow,

## 2023-06-30 ENCOUNTER — ALLIED HEALTH/NURSE VISIT (OUTPATIENT)
Dept: FAMILY MEDICINE | Facility: CLINIC | Age: 88
End: 2023-06-30
Payer: COMMERCIAL

## 2023-06-30 DIAGNOSIS — N18.31 STAGE 3A CHRONIC KIDNEY DISEASE (H): ICD-10-CM

## 2023-06-30 DIAGNOSIS — M81.0 AGE-RELATED OSTEOPOROSIS WITHOUT CURRENT PATHOLOGICAL FRACTURE: Primary | ICD-10-CM

## 2023-06-30 PROCEDURE — 96372 THER/PROPH/DIAG INJ SC/IM: CPT | Performed by: FAMILY MEDICINE

## 2023-06-30 PROCEDURE — 99207 PR NO CHARGE NURSE ONLY: CPT

## 2023-06-30 NOTE — PATIENT INSTRUCTIONS
You received your Prolia injection today  Your next Injection is due in 6 months (around 12/30/2023)  If you plan on having any dental work done within the next 6 months, please let your dentist know that you are on this medication.  We will call in advance to have your next injection scheduled.   Make sure you do not have any dental work completed involving the jaw bone within 1 month prior to your scheduled injection

## 2023-06-30 NOTE — TELEPHONE ENCOUNTER
Postponing this encounter-    Patient due for Prolia injection around 12/30/2023    1. RN- Please ask provider to put in a new CAM order for (Pre Auth) Prolia injection if one is not current (and/or if not already pending insurance re-authorization) and any lab orders needed (it is up to the provider's discretion on how often labs are checked once Prolia injections have been started)    2. Team- Please contact patient to schedule the injection with RN 2 weeks out    Make sure patient has NOT had any dental work involving the jaw bone (i.e teeth extraction) 1 month prior to injection.   Schedule 1 month out from any dental procedure involving the jaw bone.    For questions about the cost, patient may contact our CONSUMER PRICE LINE at [phone] for an estimate.     CHECK STATUS PRIOR TO ADMINISTRATION- Chart Review > Referrals tab - Select the Referral to view the report    Mary Spears RN  [facility]

## 2023-06-30 NOTE — PROGRESS NOTES
Clinic Administered Medication Documentation      Prolia Documentation    Indication: Prolia  (denosumab) is a prescription medicine used to treat osteoporosis in patients who:     Are at high risk for fracture, meaning patients who have had a fracture related to osteoporosis, or who have multiple risk factors for fracture.    Cannot use another osteoporosis medicine or other osteoporosis medicines did not work well.    The timeline for early/late injections would be 4 weeks early and any time after the 6 month dottie. If a patient receives their injection late, then the subsequent injection would be 6 months from the date that they actually received the injection.    When was the last injection?  2022  Was the last injection at least 6 months ago? Yes  Has the prior authorization been completed?  Yes  Is there an active order (written within the past 365 days, with administrations remaining, not ) in the chart?  Yes  Patient denies any dental work involving the bone (e.g. tooth extraction or dental implants) in the past 4 weeks?  Yes  Patient denies plans for any dental work involving the bone (e.g. tooth extraction or dental implants) in the next 4 weeks? Yes    The following steps were completed to comply with the REMS program for Prolia:    Reviewed information in the Medication Guide and Patient Counseling Chart, including the serious risks of Prolia  and the symptoms of each risk.    Advised patient to seek prompt medical attention if they have signs or symptoms of any of the serious risks.    Provided each patient a copy of the Medication Guide and Patient Brochure.      Prior to injection, verified patient identity using patient's name and date of birth. Medication was administered. Please see MAR and medication order for additional information. Patient instructed to remain in clinic for 15 minutes and report any adverse reaction to staff immediately.    Vial/Syringe: Syringe  Was this medication  supplied by the patient? No  Verified that the patient has refills remaining in their prescription (supplied by clinic)      Mary Spears RN  [facility]

## 2023-11-02 ENCOUNTER — LAB (OUTPATIENT)
Dept: LAB | Facility: CLINIC | Age: 88
End: 2023-11-02
Payer: COMMERCIAL

## 2023-11-02 DIAGNOSIS — Z13.220 SCREENING FOR HYPERLIPIDEMIA: ICD-10-CM

## 2023-11-02 DIAGNOSIS — N18.31 STAGE 3A CHRONIC KIDNEY DISEASE (H): Primary | ICD-10-CM

## 2023-11-02 LAB
ANION GAP SERPL CALCULATED.3IONS-SCNC: 10 MMOL/L (ref 7–15)
BUN SERPL-MCNC: 12.4 MG/DL (ref 8–23)
CALCIUM SERPL-MCNC: 9.7 MG/DL (ref 8.8–10.2)
CHLORIDE SERPL-SCNC: 105 MMOL/L (ref 98–107)
CHOLEST SERPL-MCNC: 212 MG/DL
CREAT SERPL-MCNC: 0.95 MG/DL (ref 0.51–0.95)
DEPRECATED HCO3 PLAS-SCNC: 27 MMOL/L (ref 22–29)
EGFRCR SERPLBLD CKD-EPI 2021: 58 ML/MIN/1.73M2
GLUCOSE SERPL-MCNC: 100 MG/DL (ref 70–99)
HDLC SERPL-MCNC: 72 MG/DL
HGB BLD-MCNC: 13.7 G/DL (ref 11.7–15.7)
LDLC SERPL CALC-MCNC: 121 MG/DL
NONHDLC SERPL-MCNC: 140 MG/DL
POTASSIUM SERPL-SCNC: 4.8 MMOL/L (ref 3.4–5.3)
SODIUM SERPL-SCNC: 142 MMOL/L (ref 135–145)
TRIGL SERPL-MCNC: 97 MG/DL

## 2023-11-02 PROCEDURE — 85018 HEMOGLOBIN: CPT

## 2023-11-02 PROCEDURE — 36415 COLL VENOUS BLD VENIPUNCTURE: CPT

## 2023-11-02 PROCEDURE — 80061 LIPID PANEL: CPT

## 2023-11-02 PROCEDURE — 80048 BASIC METABOLIC PNL TOTAL CA: CPT

## 2023-11-09 ENCOUNTER — OFFICE VISIT (OUTPATIENT)
Dept: FAMILY MEDICINE | Facility: CLINIC | Age: 88
End: 2023-11-09
Payer: COMMERCIAL

## 2023-11-09 VITALS
TEMPERATURE: 97.5 F | BODY MASS INDEX: 24.39 KG/M2 | OXYGEN SATURATION: 97 % | DIASTOLIC BLOOD PRESSURE: 66 MMHG | RESPIRATION RATE: 18 BRPM | HEART RATE: 79 BPM | WEIGHT: 124.2 LBS | SYSTOLIC BLOOD PRESSURE: 102 MMHG | HEIGHT: 60 IN

## 2023-11-09 DIAGNOSIS — N18.31 STAGE 3A CHRONIC KIDNEY DISEASE (H): Primary | ICD-10-CM

## 2023-11-09 DIAGNOSIS — Z00.00 ENCOUNTER FOR MEDICARE ANNUAL WELLNESS EXAM: ICD-10-CM

## 2023-11-09 PROCEDURE — G0439 PPPS, SUBSEQ VISIT: HCPCS | Performed by: STUDENT IN AN ORGANIZED HEALTH CARE EDUCATION/TRAINING PROGRAM

## 2023-11-09 ASSESSMENT — ACTIVITIES OF DAILY LIVING (ADL): CURRENT_FUNCTION: NO ASSISTANCE NEEDED

## 2023-11-09 NOTE — PROGRESS NOTES
"SUBJECTIVE:   Naga is a 88 year old who presents for Preventive Visit.      11/9/2023    10:53 AM   Additional Questions   Roomed by Mimi       Are you in the first 12 months of your Medicare coverage?  No    Healthy Habits:     In general, how would you rate your overall health?  Very good    Frequency of exercise:  4-5 days/week    Duration of exercise:  15-30 minutes    Do you usually eat at least 4 servings of fruit and vegetables a day, include whole grains    & fiber and avoid regularly eating high fat or \"junk\" foods?  Yes    Taking medications regularly:  No    Medication side effects:  None    Ability to successfully perform activities of daily living:  No assistance needed    Home Safety:  No safety concerns identified    Hearing Impairment:  No hearing concerns    In the past 6 months, have you been bothered by leaking of urine?  No    In general, how would you rate your overall mental or emotional health?  Excellent    Additional concerns today:  No          Have you ever done Advance Care Planning? (For example, a Health Directive, POLST, or a discussion with a medical provider or your loved ones about your wishes): Yes, advance care planning is on file.       Fall risk   Fallen 2 or more times in the past year?: No  Any fall with injury in the past year?: No        Cognitive Screening   1) Repeat 3 items (Leader, Season, Table)    2) Clock draw: NORMAL  3) 3 item recall: Recalls 3 objects  Results: 3 items recalled: COGNITIVE IMPAIRMENT LESS LIKELY    Mini-CogTM Copyright CRISTINA Hernandez. Licensed by the author for use in [facility]; reprinted with permission (jasbir@.[email or web address]). All rights reserved.          Reviewed and updated as needed this visit by clinical staff   Tobacco   Meds              Reviewed and updated as needed this visit by Provider                 Social History     Tobacco Use    Smoking status: Former     Packs/day: 0.00     Years: 50.00     Additional pack years: 0.00     Total " pack years: 0.00     Types: Cigarettes     Start date: 7/15/1957     Quit date: 2009     Years since quittin.1    Smokeless tobacco: Never    Tobacco comments:     I have no current problems due to smoking   Substance Use Topics    Alcohol use: Yes     Comment: 1 glass of wine per day         10/14/2020     9:43 AM   Alcohol Use   Prescreen: >3 drinks/day or >7 drinks/week? No     Do you have a current opioid prescription? No  Do you use any other controlled substances or medications that are not prescribed by a provider? None              Current providers sharing in care for this patient include:   Patient Care Team:  Shaina Mancia MD as PCP - General (Family Medicine)  Laura Anders MD as Assigned PCP    The following health maintenance items are reviewed in Epic and correct as of today:  Health Maintenance   Topic Date Due    URINALYSIS  Never done    RSV VACCINE (Pregnancy & 60+) (1 - 1-dose 60+ series) Never done    MICROALBUMIN  2019    DTAP/TDAP/TD IMMUNIZATION (2 - Td or Tdap) 2023    COVID-19 Vaccine ( -  season) 2023    EYE EXAM  2023    ANNUAL REVIEW OF HM ORDERS  2023    MEDICARE ANNUAL WELLNESS VISIT  2023    BMP  2024    LIPID  2024    HEMOGLOBIN  2024    FALL RISK ASSESSMENT  2024    DEXA  2025    ADVANCE CARE PLANNING  2028    PHQ-2 (once per calendar year)  Completed    INFLUENZA VACCINE  Completed    Pneumococcal Vaccine: 65+ Years  Completed    ZOSTER IMMUNIZATION  Completed    IPV IMMUNIZATION  Aged Out    HPV IMMUNIZATION  Aged Out    MENINGITIS IMMUNIZATION  Aged Out    RSV MONOCLONAL ANTIBODY  Aged Out    COLORECTAL CANCER SCREENING  Discontinued     Labs reviewed in EPIC      Mammogram Screening - Mammography discussed, not appropriate due to age  Pertinent mammograms are reviewed under the imaging tab.    Review of Systems  Constitutional, HEENT, cardiovascular, pulmonary, gi and gu systems  "are negative, except as otherwise noted.    OBJECTIVE:   /66   Pulse 79   Temp 97.5  F (36.4  C) (Temporal)   Resp 18   Ht 1.518 m (4' 11.75\")   Wt 56.3 kg (124 lb 3.2 oz)   SpO2 97%   BMI 24.46 kg/m   Estimated body mass index is 24.46 kg/m  as calculated from the following:    Height as of this encounter: 1.518 m (4' 11.75\").    Weight as of this encounter: 56.3 kg (124 lb 3.2 oz).  Physical Exam  GENERAL: healthy, alert and no distress  EYES: Eyes grossly normal to inspection, PERRL and conjunctivae and sclerae normal  Neck: No visible JVD or lymphadenopathy   RESP: symmetrical rise in chest   CV: No peripheral edema notable   MS: no gross musculoskeletal defects noted  SKIN: no suspicious lesions or rashes  PSYCH: mentation appears normal, affect normal/bright      Diagnostic Test Results:  Labs reviewed in Epic  No results found for any visits on 11/09/23.    ASSESSMENT / PLAN:   (N18.31) Stage 3a chronic kidney disease (H)  (primary encounter diagnosis)  Comment: Chronic, stable  Plan: UA Macroscopic with reflex to Microscopic and         Culture            (Z00.00) Encounter for Medicare annual wellness exam  Comment: Stable  Plan: REVIEW OF HEALTH MAINTENANCE PROTOCOL ORDERS              Patient has been advised of split billing requirements and indicates understanding: Yes      COUNSELING:  Reviewed preventive health counseling, as reflected in patient instructions        She reports that she quit smoking about 14 years ago. Her smoking use included cigarettes. She started smoking about 66 years ago. She has never used smokeless tobacco.      Appropriate preventive services were discussed with this patient, including applicable screening as appropriate for fall prevention, nutrition, physical activity, Tobacco-use cessation, weight loss and cognition.  Checklist reviewing preventive services available has been given to the patient.    Reviewed patients plan of care and provided an AVS. The " Basic Care Plan (routine screening as documented in Health Maintenance) for Priscilla meets the Care Plan requirement. This Care Plan has been established and reviewed with the Patient.          RODRIGUEZ TAYLOR MD  [facility]    Identified Health Risks:  I have reviewed Opioid Use Disorder and Substance Use Disorder risk factors and made any needed referrals.

## 2023-11-09 NOTE — PATIENT INSTRUCTIONS
Patient Education   Personalized Prevention Plan  You are due for the preventive services outlined below.  Your care team is available to assist you in scheduling these services.  If you have already completed any of these items, please share that information with your care team to update in your medical record.  Health Maintenance Due   Topic Date Due     Urine Test  Never done     RSV VACCINE (Pregnancy & 60+) (1 - 1-dose 60+ series) Never done     Kidney Microalbumin Urine Test  08/13/2019     Diptheria Tetanus Pertussis (DTAP/TDAP/TD) Vaccine (2 - Td or Tdap) 05/24/2023     COVID-19 Vaccine (5 - 2023-24 season) 09/01/2023     Eye Exam  09/13/2023     ANNUAL REVIEW OF HM ORDERS  11/07/2023     Annual Wellness Visit  11/07/2023

## 2023-12-13 ENCOUNTER — TELEPHONE (OUTPATIENT)
Dept: INTERNAL MEDICINE | Facility: CLINIC | Age: 88
End: 2023-12-13
Payer: COMMERCIAL

## 2023-12-13 DIAGNOSIS — N18.31 STAGE 3A CHRONIC KIDNEY DISEASE (H): ICD-10-CM

## 2023-12-13 DIAGNOSIS — M81.0 AGE-RELATED OSTEOPOROSIS WITHOUT CURRENT PATHOLOGICAL FRACTURE: Primary | ICD-10-CM

## 2023-12-13 NOTE — TELEPHONE ENCOUNTER
Patient due for Prolia injection around 12/30/2023     Scheduling team- Please contact patient to schedule the injection with RN around due date above.     Make sure patient has NOT had any dental work involving the jaw bone (i.e teeth extraction) 1 month prior to injection.   Schedule 1 month out from any dental procedure involving the jaw bone.    For questions about the cost, patient may contact our CONSUMER PRICE LINE at [phone] for an estimate.     CHECK STATUS PRIOR TO ADMINISTRATION- Chart Review > Referrals tab - Select the Referral to view the report    Mary Spears RN  [facility]

## 2023-12-13 NOTE — TELEPHONE ENCOUNTER
Prolia was previously managed by Laura Sharma who is no longer with [facility] primary are.  Active order was placed by Sánchez Whitaker who was covering for Laura Sharma at the time.  New PCP is listed as Shaina Berg T    Will route to Dr. Mancia- please advise if you are willing to take over this order. Please place CAM order and any labs needed then route to team for scheduling    Mary Spears RN  [facility]

## 2023-12-13 NOTE — TELEPHONE ENCOUNTER
LM for patient to return call and schedule prolia injection anytime after 12-30-23.    Jessy Chow,

## 2023-12-13 NOTE — TELEPHONE ENCOUNTER
New CAM order placed.     Will take over. No new labs needed    [facility]

## 2023-12-14 NOTE — TELEPHONE ENCOUNTER
Patient returned call.  Appointment scheduled for Tuesday, January 2nd at 10 a.m.    Patient has not had any dental work including extractions or jaw bone surgery within the past month.    Jessy Chow,

## 2024-01-02 ENCOUNTER — ALLIED HEALTH/NURSE VISIT (OUTPATIENT)
Dept: FAMILY MEDICINE | Facility: CLINIC | Age: 89
End: 2024-01-02
Payer: COMMERCIAL

## 2024-01-02 DIAGNOSIS — M81.0 OSTEOPOROSIS WITHOUT CURRENT PATHOLOGICAL FRACTURE, UNSPECIFIED OSTEOPOROSIS TYPE: Primary | ICD-10-CM

## 2024-01-02 PROCEDURE — 96372 THER/PROPH/DIAG INJ SC/IM: CPT | Performed by: STUDENT IN AN ORGANIZED HEALTH CARE EDUCATION/TRAINING PROGRAM

## 2024-01-02 PROCEDURE — 99207 PR NO CHARGE NURSE ONLY: CPT

## 2024-01-02 NOTE — PROGRESS NOTES
Clinic Administered Medication Documentation      Prolia Documentation    Indication: Prolia  (denosumab) is a prescription medicine used to treat osteoporosis in patients who:   Are at high risk for fracture, meaning patients who have had a fracture related to osteoporosis, or who have multiple risk factors for fracture.  Cannot use another osteoporosis medicine or other osteoporosis medicines did not work well.  The timeline for early/late injections would be 4 weeks early and any time after the 6 month dottie. If a patient receives their injection late, then the subsequent injection would be 6 months from the date that they actually received the injection.    When was the last injection?  23  Was the last injection at least 6 months ago? Yes  Has the prior authorization been completed?  Yes  Is there an active order (written within the past 365 days, with administrations remaining, not ) in the chart?  Yes  Patient denies any dental work involving the bone (e.g. tooth extraction or dental implants) in the past 4 weeks?  Yes  Patient denies plans for any dental work involving the bone (e.g. tooth extraction or dental implants) in the next 4 weeks? Yes    The following steps were completed to comply with the REMS program for Prolia:  Reviewed information in the Medication Guide and Patient Counseling Chart, including the serious risks of Prolia  and the symptoms of each risk.  Advised patient to seek prompt medical attention if they have signs or symptoms of any of the serious risks.  Provided each patient a copy of the Medication Guide and Patient Brochure.    Prior to injection, verified patient identity using patient's name and date of birth. Medication was administered. Please see MAR and medication order for additional information. Patient instructed to remain in clinic for 15 minutes and report any adverse reaction to staff immediately.    Vial/Syringe: Syringe  Was this medication supplied by the  patient? No  Verified that the patient has refills remaining in their prescription.

## 2024-01-15 DIAGNOSIS — I10 HYPERTENSION GOAL BP (BLOOD PRESSURE) < 140/90: ICD-10-CM

## 2024-01-15 RX ORDER — AMLODIPINE BESYLATE 5 MG/1
5 TABLET ORAL DAILY
Qty: 90 TABLET | Refills: 0 | Status: SHIPPED | OUTPATIENT
Start: 2024-01-15 | End: 2024-04-05

## 2024-03-05 ENCOUNTER — ALLIED HEALTH/NURSE VISIT (OUTPATIENT)
Dept: FAMILY MEDICINE | Facility: CLINIC | Age: 89
End: 2024-03-05
Payer: COMMERCIAL

## 2024-03-05 VITALS — DIASTOLIC BLOOD PRESSURE: 80 MMHG | SYSTOLIC BLOOD PRESSURE: 138 MMHG

## 2024-03-05 DIAGNOSIS — Z01.30 BP CHECK: Primary | ICD-10-CM

## 2024-03-05 PROCEDURE — 99207 PR NO CHARGE NURSE ONLY: CPT | Performed by: STUDENT IN AN ORGANIZED HEALTH CARE EDUCATION/TRAINING PROGRAM

## 2024-03-05 NOTE — PROGRESS NOTES
Priscilla Shaw was evaluated at [facility] on March 5, 2024 at which time her blood pressure was:    BP Readings from Last 1 Encounters:   03/05/24 138/80     No data recorded      Reviewed lifestyle modifications for blood pressure control and reduction: including making healthy food choices, managing weight, getting regular exercise, smoking cessation, reducing alcohol consumption, monitoring blood pressure regularly.     Symptoms: None    BP Goal:< 140/90 mmHg    BP Assessment:  BP at goal    Potential Reasons for BP too high: NA - Not applicable    BP Follow-Up Plan: Recheck BP in 6 months at pharmacy    Recommendation to Provider: none     Note completed by: Courtney Delgado RPH

## 2024-04-05 DIAGNOSIS — I10 HYPERTENSION GOAL BP (BLOOD PRESSURE) < 140/90: ICD-10-CM

## 2024-04-05 RX ORDER — AMLODIPINE BESYLATE 5 MG/1
5 TABLET ORAL DAILY
Qty: 90 TABLET | Refills: 0 | Status: SHIPPED | OUTPATIENT
Start: 2024-04-05 | End: 2024-06-06 | Stop reason: ALTCHOICE

## 2024-06-06 ENCOUNTER — TELEPHONE (OUTPATIENT)
Dept: FAMILY MEDICINE | Facility: CLINIC | Age: 89
End: 2024-06-06
Payer: COMMERCIAL

## 2024-06-06 DIAGNOSIS — I10 HYPERTENSION GOAL BP (BLOOD PRESSURE) < 140/90: Primary | ICD-10-CM

## 2024-06-06 DIAGNOSIS — N18.31 STAGE 3A CHRONIC KIDNEY DISEASE (H): ICD-10-CM

## 2024-06-06 RX ORDER — LOSARTAN POTASSIUM 25 MG/1
25 TABLET ORAL DAILY
Qty: 30 TABLET | Refills: 0 | Status: SHIPPED | OUTPATIENT
Start: 2024-06-06 | End: 2024-07-02

## 2024-06-06 NOTE — TELEPHONE ENCOUNTER
Patient notified of provider's message as written.  Patient verbalized understanding.    Lab and prolia appointments scheduled for 7/2/24      Mary Spears RN  [facility]

## 2024-06-06 NOTE — TELEPHONE ENCOUNTER
Pt calling to find out when she is due for Prolia. Last Prolia was given 1/2/24. RN advised pt that she is due on or around 7/2/24.  No dental work involving the jaw bone within the next month. No CAM order needed and pt has authorization for Prolia this calendar year. Routing to Provider to advise if labs are needed prior to appt. Please then route to team to schedule lab only if needed and prolia with RN. Thanks!    Pt states she has been on Amlodipine for years. Has a cardiologist friend who suggested she switch to Losartan. Pt is not having side effects. Per patient,  Amlodipine is an older medication and Losartan is better for stroke prevention and is newer. Can pt be switched to Losartan?     Routing to Provider to advise.    Please call pt on mobile number- [phone] with Provider's response.    Iris Mcguire RN  [facility]

## 2024-06-06 NOTE — TELEPHONE ENCOUNTER
Ok to switch amlodipine to losartan   Follow-up for labs 1 - 4 weeks after medication switch   Ok to proceed with Prolia

## 2024-07-02 ENCOUNTER — LAB (OUTPATIENT)
Dept: LAB | Facility: CLINIC | Age: 89
End: 2024-07-02
Payer: COMMERCIAL

## 2024-07-02 ENCOUNTER — ALLIED HEALTH/NURSE VISIT (OUTPATIENT)
Dept: FAMILY MEDICINE | Facility: CLINIC | Age: 89
End: 2024-07-02
Payer: COMMERCIAL

## 2024-07-02 DIAGNOSIS — M81.0 OSTEOPOROSIS WITHOUT CURRENT PATHOLOGICAL FRACTURE, UNSPECIFIED OSTEOPOROSIS TYPE: Primary | ICD-10-CM

## 2024-07-02 DIAGNOSIS — I10 HYPERTENSION GOAL BP (BLOOD PRESSURE) < 140/90: ICD-10-CM

## 2024-07-02 DIAGNOSIS — N18.31 STAGE 3A CHRONIC KIDNEY DISEASE (H): ICD-10-CM

## 2024-07-02 LAB
ANION GAP SERPL CALCULATED.3IONS-SCNC: 9 MMOL/L (ref 7–15)
BUN SERPL-MCNC: 15.4 MG/DL (ref 8–23)
CALCIUM SERPL-MCNC: 9.4 MG/DL (ref 8.8–10.2)
CHLORIDE SERPL-SCNC: 105 MMOL/L (ref 98–107)
CREAT SERPL-MCNC: 0.92 MG/DL (ref 0.51–0.95)
DEPRECATED HCO3 PLAS-SCNC: 27 MMOL/L (ref 22–29)
EGFRCR SERPLBLD CKD-EPI 2021: 60 ML/MIN/1.73M2
GLUCOSE SERPL-MCNC: 102 MG/DL (ref 70–99)
POTASSIUM SERPL-SCNC: 4.8 MMOL/L (ref 3.4–5.3)
SODIUM SERPL-SCNC: 141 MMOL/L (ref 135–145)

## 2024-07-02 PROCEDURE — 36415 COLL VENOUS BLD VENIPUNCTURE: CPT

## 2024-07-02 PROCEDURE — 96372 THER/PROPH/DIAG INJ SC/IM: CPT | Performed by: STUDENT IN AN ORGANIZED HEALTH CARE EDUCATION/TRAINING PROGRAM

## 2024-07-02 PROCEDURE — 99207 PR NO CHARGE NURSE ONLY: CPT

## 2024-07-02 PROCEDURE — 80048 BASIC METABOLIC PNL TOTAL CA: CPT

## 2024-07-02 RX ORDER — LOSARTAN POTASSIUM 25 MG/1
25 TABLET ORAL DAILY
Qty: 90 TABLET | Refills: 0 | Status: SHIPPED | OUTPATIENT
Start: 2024-07-02 | End: 2024-09-24

## 2024-07-02 NOTE — PATIENT INSTRUCTIONS
You received your Prolia injection today  Your next Injection is due in 6 months (around 1/2/2025)  If you plan on having any dental work done within the next 6 months, please let your dentist know that you are on this medication.  Make sure you do not have any dental work completed involving the jaw bone within 2 month prior to your scheduled injection

## 2024-07-02 NOTE — RESULT ENCOUNTER NOTE
Naga,    Your blood glucose is fine.     Kidney function is measured by blood work that measures creatinine and calculates estimated GFR (glomerular filtration rate).  As the eGFR reading has become more readily available with routine labs, there has been a renewed focus on managing chronic kidney disease in the medical community and at our clinic in particular.     By current criteria you do have stage 3 chronic kidney disease as your eGFR is < 60.  This is not something you should worry about as it is quite common, but it does mean we should be monitoring some labs on a regular basis and making sure we are controlling other risk factors that could cause worsening of your kidney function.  Avoid taking medications such as ibuprofen and aleve, which can cause kidney injury.     Fannie Nye MD

## 2024-07-02 NOTE — PROGRESS NOTES
Clinic Administered Medication Documentation      Prolia Documentation    Indication: Prolia  (denosumab) is a prescription medicine used to treat osteoporosis in patients who:   Are at high risk for fracture, meaning patients who have had a fracture related to osteoporosis, or who have multiple risk factors for fracture.  Cannot use another osteoporosis medicine or other osteoporosis medicines did not work well.  The timeline for early/late injections would be 4 weeks early and any time after the 6 month dottie. If a patient receives their injection late, then the subsequent injection would be 6 months from the date that they actually received the injection.    When was the last injection?  2024  Was the last injection at least 6 months ago? Yes  Has the prior authorization been completed?  Yes  Is there an active order (written within the past 365 days, with administrations remaining, not ) in the chart?  Yes   GFR Estimate   Date Value Ref Range Status   2023 58 (L) >60 mL/min/1.73m2 Final   10/08/2020 58 (L) >60 mL/min/[1.73_m2] Final     Comment:     Non  GFR Calc  Starting 2018, serum creatinine based estimated GFR (eGFR) will be   calculated using the Chronic Kidney Disease Epidemiology Collaboration   (CKD-EPI) equation.       Has patient had a GFR within the last 12 months? Yes   Is GFR under 30, or patient has a diagnosis of CKD4 or CKD5? No   Patient denies gastric bypass or parathyroid surgery in past 6 months? Yes - patient denies.   Patient denies dental work in the past two months involving drilling into the bone, such as implants/extractions, oral surgery or a tooth extraction that has not healed yet?  Yes  Patient denies plans for an emergency tooth extraction within the next week? Yes    The following steps were completed to comply with the REMS program for Prolia:  Reviewed information in the Medication Guide, including the serious risks of Prolia  and the  symptoms of each risk.  Advised patient to seek prompt medical attention if they have signs or symptoms of any of the serious risks.  Provided each patient a copy of the Medication Guide and Patient Guide.    Prior to injection, verified patient identity using patient's name and date of birth. Medication was administered. Please see MAR and medication order for additional information. Patient instructed to remain in clinic for 15 minutes and report any adverse reaction to staff immediately.    Vial/Syringe: Syringe  Was this medication supplied by the patient? No  Verified that the patient has refills remaining in their prescription.    Mary Spears RN  [facility]

## 2024-07-03 ENCOUNTER — MYC REFILL (OUTPATIENT)
Dept: FAMILY MEDICINE | Facility: CLINIC | Age: 89
End: 2024-07-03
Payer: COMMERCIAL

## 2024-07-03 DIAGNOSIS — N18.31 STAGE 3A CHRONIC KIDNEY DISEASE (H): ICD-10-CM

## 2024-07-03 DIAGNOSIS — I10 HYPERTENSION GOAL BP (BLOOD PRESSURE) < 140/90: ICD-10-CM

## 2024-07-03 RX ORDER — LOSARTAN POTASSIUM 25 MG/1
25 TABLET ORAL DAILY
Qty: 90 TABLET | Refills: 0 | OUTPATIENT
Start: 2024-07-03

## 2024-07-05 NOTE — TELEPHONE ENCOUNTER
"Pt is calling to ask about status of losartan. She does not understand what \"duplicate\" means. Notified her that there should be a prescription already on file at the pharmacy. We received a separate request from pharmacy 7/2 and Rx was sent. She verbalized understanding.    Vianney Gramajo RN   "

## 2024-09-24 DIAGNOSIS — N18.31 STAGE 3A CHRONIC KIDNEY DISEASE (H): ICD-10-CM

## 2024-09-24 DIAGNOSIS — I10 HYPERTENSION GOAL BP (BLOOD PRESSURE) < 140/90: ICD-10-CM

## 2024-09-24 RX ORDER — LOSARTAN POTASSIUM 25 MG/1
25 TABLET ORAL DAILY
Qty: 90 TABLET | Refills: 0 | Status: SHIPPED | OUTPATIENT
Start: 2024-09-24

## 2024-11-05 ENCOUNTER — TRANSFERRED RECORDS (OUTPATIENT)
Dept: MULTI SPECIALTY CLINIC | Facility: CLINIC | Age: 89
End: 2024-11-05
Payer: COMMERCIAL

## 2024-11-05 LAB — RETINOPATHY: NORMAL

## 2024-11-08 ENCOUNTER — LAB (OUTPATIENT)
Dept: LAB | Facility: CLINIC | Age: 89
End: 2024-11-08
Payer: COMMERCIAL

## 2024-11-08 DIAGNOSIS — N18.31 STAGE 3A CHRONIC KIDNEY DISEASE (H): ICD-10-CM

## 2024-11-08 DIAGNOSIS — Z13.220 SCREENING FOR HYPERLIPIDEMIA: Primary | ICD-10-CM

## 2024-11-08 LAB
CHOLEST SERPL-MCNC: 202 MG/DL
FASTING STATUS PATIENT QL REPORTED: YES
HDLC SERPL-MCNC: 88 MG/DL
HGB BLD-MCNC: 14.6 G/DL (ref 11.7–15.7)
LDLC SERPL CALC-MCNC: 95 MG/DL
NONHDLC SERPL-MCNC: 114 MG/DL
TRIGL SERPL-MCNC: 97 MG/DL

## 2024-11-08 PROCEDURE — 80061 LIPID PANEL: CPT

## 2024-11-08 PROCEDURE — 36415 COLL VENOUS BLD VENIPUNCTURE: CPT

## 2024-11-08 PROCEDURE — 85018 HEMOGLOBIN: CPT

## 2024-11-13 SDOH — HEALTH STABILITY: PHYSICAL HEALTH: ON AVERAGE, HOW MANY DAYS PER WEEK DO YOU ENGAGE IN MODERATE TO STRENUOUS EXERCISE (LIKE A BRISK WALK)?: 7 DAYS

## 2024-11-13 SDOH — HEALTH STABILITY: PHYSICAL HEALTH: ON AVERAGE, HOW MANY MINUTES DO YOU ENGAGE IN EXERCISE AT THIS LEVEL?: 100 MIN

## 2024-11-13 ASSESSMENT — SOCIAL DETERMINANTS OF HEALTH (SDOH): HOW OFTEN DO YOU GET TOGETHER WITH FRIENDS OR RELATIVES?: THREE TIMES A WEEK

## 2024-11-15 ENCOUNTER — OFFICE VISIT (OUTPATIENT)
Dept: FAMILY MEDICINE | Facility: CLINIC | Age: 89
End: 2024-11-15
Attending: STUDENT IN AN ORGANIZED HEALTH CARE EDUCATION/TRAINING PROGRAM
Payer: COMMERCIAL

## 2024-11-15 VITALS
OXYGEN SATURATION: 99 % | BODY MASS INDEX: 23.06 KG/M2 | HEART RATE: 68 BPM | SYSTOLIC BLOOD PRESSURE: 166 MMHG | RESPIRATION RATE: 16 BRPM | WEIGHT: 114.4 LBS | HEIGHT: 59 IN | TEMPERATURE: 97.8 F | DIASTOLIC BLOOD PRESSURE: 98 MMHG

## 2024-11-15 DIAGNOSIS — I10 HYPERTENSION GOAL BP (BLOOD PRESSURE) < 140/90: ICD-10-CM

## 2024-11-15 DIAGNOSIS — Z00.00 ENCOUNTER FOR MEDICARE ANNUAL WELLNESS EXAM: Primary | ICD-10-CM

## 2024-11-15 DIAGNOSIS — Z29.11 NEED FOR VACCINATION AGAINST RESPIRATORY SYNCYTIAL VIRUS: ICD-10-CM

## 2024-11-15 DIAGNOSIS — Z23 NEED FOR TDAP VACCINATION: ICD-10-CM

## 2024-11-15 DIAGNOSIS — M81.0 AGE-RELATED OSTEOPOROSIS WITHOUT CURRENT PATHOLOGICAL FRACTURE: ICD-10-CM

## 2024-11-15 DIAGNOSIS — N18.31 STAGE 3A CHRONIC KIDNEY DISEASE (H): ICD-10-CM

## 2024-11-15 RX ORDER — LOSARTAN POTASSIUM 25 MG/1
25 TABLET ORAL DAILY
Qty: 90 TABLET | Refills: 3 | Status: SHIPPED | OUTPATIENT
Start: 2024-11-15

## 2024-11-15 NOTE — PROGRESS NOTES
Preventive Care Visit  [facility]  Nov 15, 2024      Assessment & Plan     (Z00.00) Encounter for Medicare annual wellness exam  (primary encounter diagnosis)  Comment: Stable  Plan: Overall doing well    (I10) Hypertension goal BP (blood pressure) < 140/90  Comment: Chronic, controlled outside of the clinic. Will continue with medication. Refills sent   Plan: REVIEW OF HEALTH MAINTENANCE PROTOCOL ORDERS,         losartan (COZAAR) 25 MG tablet            (N18.31) Stage 3a chronic kidney disease (H)  Comment: Chronic, stable  Plan: REVIEW OF HEALTH MAINTENANCE PROTOCOL ORDERS,         losartan (COZAAR) 25 MG tablet            (Z23) Need for Tdap vaccination  Comment: Stable  Plan: REVIEW OF HEALTH MAINTENANCE PROTOCOL ORDERS,         Tdap, tetanus-diptheria-acell pertussis,         (BOOSTRIX) 5-2.5-18.5 LF-MCG/0.5 CAM injection            (Z29.11) Need for vaccination against respiratory syncytial virus  Comment: Stable  Plan: REVIEW OF HEALTH MAINTENANCE PROTOCOL ORDERS,         RSV vaccine, bivalent, ABRYSVO, injection            (M81.0) Age-related osteoporosis without current pathological fracture  Comment: Chronic, stable  Plan: denosumab (PROLIA) injection 60 mg, Creatinine,        Calcium, Vitamin D Deficiency            Dictation Disclaimer: Some of this Note has been completed with voice-recognition dictation software. Although errors are generally corrected real-time, there is the potential for a rare error to be present in the completed chart.               Counseling  Appropriate preventive services were addressed with this patient via screening, questionnaire, or discussion as appropriate for fall prevention, nutrition, physical activity, Tobacco-use cessation, social engagement, weight loss and cognition.  Checklist reviewing preventive services available has been given to the patient.  Reviewed patient's diet, addressing concerns  and/or questions.           Subjective   Naga is a 89 year old, presenting for the following:  Physical          Via the Health Maintenance questionnaire, the patient has reported the following services have been completed -Eye Exam: Grant 2024-11-05, this information has been sent to the abstraction team.    HPI  Pt repots doing well overall. She reports looking forward to going to [place] for Thanksgiving.        Health Care Directive  Patient has a Health Care Directive on file  Advance care planning document is on file and is current.      11/13/2024   General Health   How would you rate your overall physical health? Excellent            11/13/2024   Nutrition   Diet: Regular (no restrictions)            11/13/2024   Exercise   Days per week of moderate/strenous exercise 7 days   Average minutes spent exercising at this level 100 min            11/13/2024   Social Factors   Frequency of gathering with friends or relatives Three times a week   Worry food won't last until get money to buy more No   Food not last or not have enough money for food? No   Do you have housing? (Housing is defined as stable permanent housing and does not include staying ouside in a car, in a tent, in an abandoned building, in an overnight shelter, or couch-surfing.) Yes   Are you worried about losing your housing? No   Lack of transportation? No   Unable to get utilities (heat,electricity)? No            11/13/2024   Fall Risk   Fallen 2 or more times in the past year? No     No    Trouble with walking or balance? No     No        Patient-reported    Multiple values from one day are sorted in reverse-chronological order          11/13/2024   Activities of Daily Living- Home Safety   Needs help with the following daily activites None of the above   Safety concerns in the home None of the above            11/13/2024   Dental   Dentist two times every year? Yes            11/13/2024   Hearing Screening   Hearing concerns? None of the above             11/13/2024   Driving Risk Screening   Patient/family members have concerns about driving No            11/13/2024   General Alertness/Fatigue Screening   Have you been more tired than usual lately? No            11/13/2024   Urinary Incontinence Screening   Bothered by leaking urine in past 6 months No            11/13/2024   TB Screening   Were you born outside of the [place]? No            Today's PHQ-2 Score:       11/15/2024    10:12 AM   PHQ-2 ( 1999 [employer])   Q1: Little interest or pleasure in doing things 0   Q2: Feeling down, depressed or hopeless 0   PHQ-2 Score 0           11/13/2024   Substance Use   Alcohol more than 3/day or more than 7/wk No   Do you have a current opioid prescription? No   How severe/bad is pain from 1 to 10? 0/10 (No Pain)   Do you use any other substances recreationally? No        Social History     Tobacco Use    Smoking status: Former     Current packs/day: 0.00     Types: Cigarettes     Start date: 7/15/1957     Quit date: 9/16/2009     Years since quitting: 15.1     Passive exposure: Past    Smokeless tobacco: Never    Tobacco comments:     I have no current problems due to smoking   Vaping Use    Vaping status: Never Used   Substance Use Topics    Alcohol use: Yes     Comment: 1 glass of wine per day    Drug use: No           1/18/2022   LAST FHS-7 RESULTS   1st degree relative breast or ovarian cancer No   Any relative bilateral breast cancer No   Any male have breast cancer No   Any ONE woman have BOTH breast AND ovarian cancer No   Any woman with breast cancer before 50yrs No   2 or more relatives with breast AND/OR ovarian cancer No   2 or more relatives with breast AND/OR bowel cancer No           Mammogram Screening - After age 74- determine frequency with patient based on health status, life expectancy and patient goals          Reviewed and updated as needed this visit by Provider                    Labs reviewed in EPIC  Current providers sharing in care for this  "patient include:  Patient Care Team:  Shaina Gerber MD as PCP - General (Family Medicine)  Shaina Gerber MD as Assigned PCP    The following health maintenance items are reviewed in Epic and correct as of today:  Health Maintenance   Topic Date Due    RSV VACCINE (1 - 1-dose 75+ series) Never done    DTAP/TDAP/TD IMMUNIZATION (2 - Td or Tdap) 05/24/2023    COVID-19 Vaccine (5 - 2024-25 season) 09/01/2024    MICROALBUMIN  11/09/2024    ANNUAL REVIEW OF HM ORDERS  11/09/2024    MEDICARE ANNUAL WELLNESS VISIT  11/09/2024    DEXA  03/07/2025    BMP  07/02/2025    EYE EXAM  11/05/2025    LIPID  11/08/2025    HEMOGLOBIN  11/08/2025    FALL RISK ASSESSMENT  11/15/2025    ADVANCE CARE PLANNING  11/09/2028    PHQ-2 (once per calendar year)  Completed    INFLUENZA VACCINE  Completed    Pneumococcal Vaccine: 65+ Years  Completed    URINALYSIS  Completed    ZOSTER IMMUNIZATION  Completed    HPV IMMUNIZATION  Aged Out    MENINGITIS IMMUNIZATION  Aged Out    RSV MONOCLONAL ANTIBODY  Aged Out    COLORECTAL CANCER SCREENING  Discontinued        ROS: 10 point ROS neg other than the symptoms noted above in the HPI.       Objective    Exam  BP (!) 166/98   Pulse 68   Temp 97.8  F (36.6  C) (Temporal)   Resp 16   Ht 1.51 m (4' 11.45\")   Wt 51.9 kg (114 lb 6.4 oz)   SpO2 99%   BMI 22.76 kg/m     Estimated body mass index is 22.76 kg/m  as calculated from the following:    Height as of this encounter: 1.51 m (4' 11.45\").    Weight as of this encounter: 51.9 kg (114 lb 6.4 oz).    Physical Exam  GENERAL: healthy, alert and no distress  EYES: Eyes grossly normal to inspection, PERRL and conjunctivae and sclerae normal  Neck: No visible JVD or lymphadenopathy   RESP: symmetrical rise in chest   CV: No peripheral edema notable   MS: no gross musculoskeletal defects noted  SKIN: no suspicious lesions or rashes  PSYCH: mentation appears normal, affect normal/bright           No data to display          "              Signed Electronically by: RODRIGUEZ HARRISON MD

## 2024-11-15 NOTE — PROGRESS NOTES
The following steps were completed to comply with the REMS program for Prolia:  Reviewed the serious risks of Prolia  and the symptoms of each risk.  Advised patient to seek prompt medical attention if they have signs or symptoms of any of the serious risks.  Patient will be provided a copy of the Medication Guide and Patient Brochure prior to first injection.    RODRIGUEZ HARRISON MD

## 2024-11-15 NOTE — PATIENT INSTRUCTIONS
You have selected the below site for your Prolia injection. If you did not schedule this appointment in clinic, please call the number listed below.  Vanessa PATI) [phone]   Patient Education   Preventive Care Advice   This is general advice given by our system to help you stay healthy. However, your care team may have specific advice just for you. Please talk to your care team about your preventive care needs.  Nutrition  Eat 5 or more servings of fruits and vegetables each day.  Try wheat bread, brown rice and whole grain pasta (instead of white bread, rice, and pasta).  Get enough calcium and vitamin D. Check the label on foods and aim for 100% of the RDA (recommended daily allowance).  Lifestyle  Exercise at least 150 minutes each week  (30 minutes a day, 5 days a week).  Do muscle strengthening activities 2 days a week. These help control your weight and prevent disease.  No smoking.  Wear sunscreen to prevent skin cancer.  Have a dental exam and cleaning every 6 months.  Yearly exams  See your health care team every year to talk about:  Any changes in your health.  Any medicines your care team has prescribed.  Preventive care, family planning, and ways to prevent chronic diseases.  Shots (vaccines)   HPV shots (up to age 26), if you've never had them before.  Hepatitis B shots (up to age 59), if you've never had them before.  COVID-19 shot: Get this shot when it's due.  Flu shot: Get a flu shot every year.  Tetanus shot: Get a tetanus shot every 10 years.  Pneumococcal, hepatitis A, and RSV shots: Ask your care team if you need these based on your risk.  Shingles shot (for age 50 and up)  General health tests  Diabetes screening:  Starting at age 35, Get screened for diabetes at least every 3 years.  If you are younger than age 35, ask your care team if you should be screened for diabetes.  Cholesterol test: At age 39, start having a cholesterol test every 5 years, or more often if advised.  Bone density  scan (DEXA): At age 50, ask your care team if you should have this scan for osteoporosis (brittle bones).  Hepatitis C: Get tested at least once in your life.  STIs (sexually transmitted infections)  Before age 24: Ask your care team if you should be screened for STIs.  After age 24: Get screened for STIs if you're at risk. You are at risk for STIs (including HIV) if:  You are sexually active with more than one person.  You don't use condoms every time.  You or a partner was diagnosed with a sexually transmitted infection.  If you are at risk for HIV, ask about PrEP medicine to prevent HIV.  Get tested for HIV at least once in your life, whether you are at risk for HIV or not.  Cancer screening tests  Cervical cancer screening: If you have a cervix, begin getting regular cervical cancer screening tests starting at age 21.  Breast cancer scan (mammogram): If you've ever had breasts, begin having regular mammograms starting at age 40. This is a scan to check for breast cancer.  Colon cancer screening: It is important to start screening for colon cancer at age 45.  Have a colonoscopy test every 10 years (or more often if you're at risk) Or, ask your provider about stool tests like a FIT test every year or Cologuard test every 3 years.  To learn more about your testing options, visit:   .  For help making a decision, visit:   https://bit.ly/vj87316.  Prostate cancer screening test: If you have a prostate, ask your care team if a prostate cancer screening test (PSA) at age 55 is right for you.  Lung cancer screening: If you are a current or former smoker ages 50 to 80, ask your care team if ongoing lung cancer screenings are right for you.  For informational purposes only. Not to replace the advice of your health care provider. Copyright   2023 [employer]. All rights reserved. Clinically reviewed by the [facility] Transitions Program. [employer] 385188 - REV 01/24.

## 2024-12-09 NOTE — TELEPHONE ENCOUNTER
Per Dr. Osborne: no need for another mammogram (last mammogram was negaitve and she is 82 y.o)    Mary Chávez RN     ER

## 2025-01-07 ENCOUNTER — LAB (OUTPATIENT)
Dept: LAB | Facility: CLINIC | Age: 89
End: 2025-01-07
Payer: COMMERCIAL

## 2025-01-07 DIAGNOSIS — N18.31 STAGE 3A CHRONIC KIDNEY DISEASE (H): Primary | ICD-10-CM

## 2025-01-07 PROCEDURE — 82043 UR ALBUMIN QUANTITATIVE: CPT

## 2025-01-07 PROCEDURE — 82570 ASSAY OF URINE CREATININE: CPT

## 2025-01-08 LAB
CREAT UR-MCNC: 83.8 MG/DL
MICROALBUMIN UR-MCNC: 17.7 MG/L
MICROALBUMIN/CREAT UR: 21.12 MG/G CR (ref 0–25)

## 2025-01-09 ENCOUNTER — ALLIED HEALTH/NURSE VISIT (OUTPATIENT)
Dept: FAMILY MEDICINE | Facility: CLINIC | Age: 89
End: 2025-01-09
Payer: COMMERCIAL

## 2025-01-09 DIAGNOSIS — M81.0 AGE-RELATED OSTEOPOROSIS WITHOUT CURRENT PATHOLOGICAL FRACTURE: ICD-10-CM

## 2025-01-09 DIAGNOSIS — M81.0 OSTEOPOROSIS WITHOUT CURRENT PATHOLOGICAL FRACTURE, UNSPECIFIED OSTEOPOROSIS TYPE: Primary | ICD-10-CM

## 2025-01-09 LAB
CALCIUM SERPL-MCNC: 9.9 MG/DL (ref 8.8–10.4)
CREAT SERPL-MCNC: 0.96 MG/DL (ref 0.51–0.95)
EGFRCR SERPLBLD CKD-EPI 2021: 57 ML/MIN/1.73M2
VIT D+METAB SERPL-MCNC: 73 NG/ML (ref 20–50)

## 2025-01-09 NOTE — PROGRESS NOTES
Clinic Administered Medication Documentation      Prolia Documentation    Indication: Prolia  (denosumab) is a prescription medicine used to treat osteoporosis in patients who:   Are at high risk for fracture, meaning patients who have had a fracture related to osteoporosis, or who have multiple risk factors for fracture.  Cannot use another osteoporosis medicine or other osteoporosis medicines did not work well.  The timeline for early/late injections would be 4 weeks early and any time after the 6 month dottie. If a patient receives their injection late, then the subsequent injection would be 6 months from the date that they actually received the injection.    When was the last injection?  24  Was the last injection at least 6 months ago? Yes  Has the prior authorization been completed?  Yes  Is there an active order (written within the past 365 days, with administrations remaining, not ) in the chart?  Yes   GFR Estimate   Date Value Ref Range Status   2024 60 (L) >60 mL/min/1.73m2 Final     Comment:     eGFR calculated using  CKD-EPI equation.   10/08/2020 58 (L) >60 mL/min/[1.73_m2] Final     Comment:     Non  GFR Calc  Starting 2018, serum creatinine based estimated GFR (eGFR) will be   calculated using the Chronic Kidney Disease Epidemiology Collaboration   (CKD-EPI) equation.       Has patient had a GFR within the last 12 months? Yes   Is GFR under 30, or patient has a diagnosis of CKD4 or CKD5? No   Patient denies gastric bypass or parathyroid surgery in past 6 months? Yes - patient denies.   Patient denies dental work in the past two months involving drilling into the bone, such as implants/extractions, oral surgery or a tooth extraction that has not healed yet?  Yes  Patient denies plans for an emergency tooth extraction within the next week? Yes    The following steps were completed to comply with the REMS program for Prolia:  Reviewed information in the  Medication Guide, including the serious risks of Prolia  and the symptoms of each risk.  Advised patient to seek prompt medical attention if they have signs or symptoms of any of the serious risks.  Provided each patient a copy of the Medication Guide and Patient Guide.    Prior to injection, verified patient identity using patient's name and date of birth. Medication was administered. Please see MAR and medication order for additional information. Patient instructed to remain in clinic for 15 minutes and report any adverse reaction to staff immediately.    Vial/Syringe: Syringe  Was this medication supplied by the patient? No  Verified that the patient has administrations remaining in their prescription.  Huddled with Shaina Giles due to pre-injections labs she ordered were not drawn (creatinine, calcium, Vit D). Provider gave verbal order to proceed with the Prolia injection    Mary Spears RN  [facility]

## 2025-06-17 ENCOUNTER — OFFICE VISIT (OUTPATIENT)
Dept: FAMILY MEDICINE | Facility: CLINIC | Age: 89
End: 2025-06-17
Payer: COMMERCIAL

## 2025-06-17 VITALS
DIASTOLIC BLOOD PRESSURE: 72 MMHG | HEIGHT: 60 IN | WEIGHT: 115.6 LBS | BODY MASS INDEX: 22.7 KG/M2 | HEART RATE: 77 BPM | RESPIRATION RATE: 16 BRPM | TEMPERATURE: 98.5 F | OXYGEN SATURATION: 98 % | SYSTOLIC BLOOD PRESSURE: 148 MMHG

## 2025-06-17 DIAGNOSIS — Z13.29 SCREENING FOR HYPOTHYROIDISM: ICD-10-CM

## 2025-06-17 DIAGNOSIS — H26.8 OTHER CATARACT OF BOTH EYES: ICD-10-CM

## 2025-06-17 DIAGNOSIS — N18.31 STAGE 3A CHRONIC KIDNEY DISEASE (H): ICD-10-CM

## 2025-06-17 DIAGNOSIS — M81.0 AGE-RELATED OSTEOPOROSIS WITHOUT CURRENT PATHOLOGICAL FRACTURE: Primary | ICD-10-CM

## 2025-06-17 DIAGNOSIS — Z01.818 PREOP GENERAL PHYSICAL EXAM: ICD-10-CM

## 2025-06-17 LAB
BASOPHILS # BLD AUTO: 0 10E3/UL (ref 0–0.2)
BASOPHILS NFR BLD AUTO: 1 %
EOSINOPHIL # BLD AUTO: 0.1 10E3/UL (ref 0–0.7)
EOSINOPHIL NFR BLD AUTO: 1 %
ERYTHROCYTE [DISTWIDTH] IN BLOOD BY AUTOMATED COUNT: 13.1 % (ref 10–15)
HCT VFR BLD AUTO: 41.1 % (ref 35–47)
HGB BLD-MCNC: 13.2 G/DL (ref 11.7–15.7)
IMM GRANULOCYTES # BLD: 0 10E3/UL
IMM GRANULOCYTES NFR BLD: 0 %
LYMPHOCYTES # BLD AUTO: 1.5 10E3/UL (ref 0.8–5.3)
LYMPHOCYTES NFR BLD AUTO: 24 %
MCH RBC QN AUTO: 31.4 PG (ref 26.5–33)
MCHC RBC AUTO-ENTMCNC: 32.1 G/DL (ref 31.5–36.5)
MCV RBC AUTO: 98 FL (ref 78–100)
MONOCYTES # BLD AUTO: 0.5 10E3/UL (ref 0–1.3)
MONOCYTES NFR BLD AUTO: 7 %
NEUTROPHILS # BLD AUTO: 4.2 10E3/UL (ref 1.6–8.3)
NEUTROPHILS NFR BLD AUTO: 67 %
PLATELET # BLD AUTO: 175 10E3/UL (ref 150–450)
RBC # BLD AUTO: 4.2 10E6/UL (ref 3.8–5.2)
WBC # BLD AUTO: 6.3 10E3/UL (ref 4–11)

## 2025-06-17 PROCEDURE — 84100 ASSAY OF PHOSPHORUS: CPT | Performed by: STUDENT IN AN ORGANIZED HEALTH CARE EDUCATION/TRAINING PROGRAM

## 2025-06-17 PROCEDURE — 82306 VITAMIN D 25 HYDROXY: CPT | Performed by: STUDENT IN AN ORGANIZED HEALTH CARE EDUCATION/TRAINING PROGRAM

## 2025-06-17 PROCEDURE — 84443 ASSAY THYROID STIM HORMONE: CPT | Mod: GZ | Performed by: STUDENT IN AN ORGANIZED HEALTH CARE EDUCATION/TRAINING PROGRAM

## 2025-06-17 PROCEDURE — 3077F SYST BP >= 140 MM HG: CPT | Performed by: STUDENT IN AN ORGANIZED HEALTH CARE EDUCATION/TRAINING PROGRAM

## 2025-06-17 PROCEDURE — 3078F DIAST BP <80 MM HG: CPT | Performed by: STUDENT IN AN ORGANIZED HEALTH CARE EDUCATION/TRAINING PROGRAM

## 2025-06-17 PROCEDURE — 36415 COLL VENOUS BLD VENIPUNCTURE: CPT | Performed by: STUDENT IN AN ORGANIZED HEALTH CARE EDUCATION/TRAINING PROGRAM

## 2025-06-17 PROCEDURE — G2211 COMPLEX E/M VISIT ADD ON: HCPCS | Performed by: STUDENT IN AN ORGANIZED HEALTH CARE EDUCATION/TRAINING PROGRAM

## 2025-06-17 PROCEDURE — 99214 OFFICE O/P EST MOD 30 MIN: CPT | Performed by: STUDENT IN AN ORGANIZED HEALTH CARE EDUCATION/TRAINING PROGRAM

## 2025-06-17 PROCEDURE — 80053 COMPREHEN METABOLIC PANEL: CPT | Performed by: STUDENT IN AN ORGANIZED HEALTH CARE EDUCATION/TRAINING PROGRAM

## 2025-06-17 PROCEDURE — 83970 ASSAY OF PARATHORMONE: CPT | Performed by: STUDENT IN AN ORGANIZED HEALTH CARE EDUCATION/TRAINING PROGRAM

## 2025-06-17 PROCEDURE — 85025 COMPLETE CBC W/AUTO DIFF WBC: CPT | Performed by: STUDENT IN AN ORGANIZED HEALTH CARE EDUCATION/TRAINING PROGRAM

## 2025-06-17 PROCEDURE — 83735 ASSAY OF MAGNESIUM: CPT | Performed by: STUDENT IN AN ORGANIZED HEALTH CARE EDUCATION/TRAINING PROGRAM

## 2025-06-17 NOTE — PROGRESS NOTES
Preoperative Evaluation  [facility]  [address]  [place] [address]  Phone: [phone]  Primary Provider: RODRIGUEZ HARRISON MD  Pre-op Performing Provider: RODRIGUEZ HARRISON MD  Jun 17, 2025             6/15/2025   Surgical Information   What procedure is being done? pre-op for eye surgery   Facility or Hospital where procedure/surgery will be performed: [facility]    Who is doing the procedure / surgery? Dr. Ordonez   Date of surgery / procedure: 07/02/25 & 07/16/25   Time of surgery / procedure: unknown   Where do you plan to recover after surgery? at home with family     Fax number for surgical facility: [phone]    Assessment & Plan     The proposed surgical procedure is considered LOW risk.    (M81.0) Age-related osteoporosis without current pathological fracture  (primary encounter diagnosis)  Comment: Stable  Plan: DEXA HIP/PELVIS/SPINE - Future, REVIEW OF         HEALTH MAINTENANCE PROTOCOL ORDERS, Magnesium         (reminder for Prolia start / recommended if not        done in last 3 yr), Parathyroid Hormone Intact         (PTH), Phosphorus (recommended if not done in         last year), Vitamin D level (if not done in         last year)            (N18.31) Stage 3a chronic kidney disease (H)  Comment: Chronic, stable  Plan: REVIEW OF HEALTH MAINTENANCE PROTOCOL ORDERS            (Z01.818) Preop general physical exam  Comment: Stable. Pending labs  Plan: REVIEW OF HEALTH MAINTENANCE PROTOCOL ORDERS,         Comprehensive metabolic panel (BMP + Alb, Alk         Phos, ALT, AST, Total. Bili, TP), CBC with         platelets and differential            (H26.8) Other cataract of both eyes  Comment: Chronic, uncontrolled  Plan: REVIEW OF HEALTH MAINTENANCE PROTOCOL ORDERS,         Comprehensive metabolic panel (BMP + Alb, Alk         Phos, ALT, AST, Total. Bili, TP), CBC with         platelets and differential             (Z13.29) Screening for hypothyroidism  Comment: Stable  Plan: REVIEW OF HEALTH MAINTENANCE PROTOCOL ORDERS,         TSH with free T4 reflex                The longitudinal plan of care for the diagnosis(es)/condition(s) as documented were addressed during this visit. Due to the added complexity in care, I will continue to support Naga in the subsequent management and with ongoing continuity of care.              - No identified additional risk factors other than previously addressed    Preoperative Medication Instructions  Antiplatelet or Anticoagulation Medication Instructions   - Bleeding risk is low for this procedure (e.g. dental, skin, cataract).    Additional Medication Instructions   - Herbal medications and vitamins: DO NOT TAKE 14 days prior to surgery.   - ACE/ARB/ARNI (lisinopril, enalapril, losartan, valsartan, olmesartan, sacubritril/valsartan) : Continue without modification (e.g., MAC anesthesia, neurosurgery, spine surgery, heart failure, or labile hypertension with risk of hypertension).    Recommendation  Approval given to proceed with proposed procedure, without further diagnostic evaluation.        Subjective   Naga is a 89 year old, presenting for the following:  Pre-Op Exam          6/17/2025     2:06 PM   Additional Questions   Roomed by lisa HASSAN: All questions and concerns answered          6/15/2025   Pre-Op Questionnaire   Have you ever had a heart attack or stroke? No   Have you ever had surgery on your heart or blood vessels, such as a stent placement, a coronary artery bypass, or surgery on an artery in your head, neck, heart, or legs? No   Do you have chest pain with activity? No   Do you have a history of heart failure? No   Do you currently have a cold, bronchitis or symptoms of other infection? No   Do you have a cough, shortness of breath, or wheezing? No   Do you or anyone in your family have previous history of blood clots? No   Do you or does anyone in your family have a  serious bleeding problem such as prolonged bleeding following surgeries or cuts? No   Have you ever had problems with anemia or been told to take iron pills? No   Have you had any abnormal blood loss such as black, tarry or bloody stools, or abnormal vaginal bleeding? No   Have you ever had a blood transfusion? No   Are you willing to have a blood transfusion if it is medically needed before, during, or after your surgery? Yes   Have you or any of your relatives ever had problems with anesthesia? No   Do you have sleep apnea, excessive snoring or daytime drowsiness? No   Do you have any artifical heart valves or other implanted medical devices like a pacemaker, defibrillator, or continuous glucose monitor? No   Do you have artificial joints? No   Are you allergic to latex? No     Advance Care Planning    Document on file is a Health Care Directive or POLST.    Preoperative Review of    reviewed - no record of controlled substances prescribed.          Patient Active Problem List    Diagnosis Date Noted    Hypertension goal BP (blood pressure) < 140/90 04/26/2011     Priority: High    Osteoporosis      Priority: High     10 year probability of a major osteoporotic fracture (anywhere in your body) at 14% and in your hip at 3.5%.  I would recommend continuing your Boniva until 2014.  Wrist fracture in 2015.  Patient intolerant to fosamax and would only consider restarted Boniva.        Stage 3a chronic kidney disease (H) 10/15/2021     Priority: Medium    Vertigo 05/04/2012     Priority: Medium      Past Medical History:   Diagnosis Date    Hyperlipidemia LDL goal <130 1/16/2014    Hypertension goal BP (blood pressure) < 140/90 4/26/2011    Osteoporosis     Vertigo      Past Surgical History:   Procedure Laterality Date    APPENDECTOMY      COLONOSCOPY  09/2014    HYSTERECTOMY, BERNADETTE  1983     Current Outpatient Medications   Medication Sig Dispense Refill    aspirin 81 MG tablet Take 1 tablet by mouth daily.       "Cholecalciferol (VITAMIN D3) 25 MCG (1000 UT) CAPS Take 1 capsule by mouth daily      COD LIVER OIL OR CAPS 1,000mg      denosumab (PROLIA) 60 MG/ML SOSY injection Inject 1 mL (60 mg) Subcutaneous every 6 months 1 mL 1    losartan (COZAAR) 25 MG tablet Take 1 tablet (25 mg) by mouth daily. 90 tablet 3    magnesium chloride 535 (64 Mg) MG TBEC CR tablet Take 535 mg by mouth daily      Multiple Vitamins-Minerals (CENTRUM SILVER) per tablet Take 1 tablet by mouth daily. 1 tablet      SUPER B COMPLEX OR TABS 1 daily       VIACTIV 500-500-40 MG-UNT-MCG OR CHEW 1 daily         Allergies   Allergen Reactions    Fosamax [Alendronate] Other (See Comments)     Stomach pain        Social History     Tobacco Use    Smoking status: Former     Current packs/day: 0.00     Types: Cigarettes     Start date: 7/15/1957     Quit date: 9/16/2009     Years since quitting: 15.7     Passive exposure: Past    Smokeless tobacco: Never    Tobacco comments:     I have no current problems due to smoking   Substance Use Topics    Alcohol use: Yes     Comment: 1 glass of wine per day       History   Drug Use No            ROS: 10 point ROS neg other than the symptoms noted above in the HPI.      Objective    There were no vitals taken for this visit.   Estimated body mass index is 22.76 kg/m  as calculated from the following:    Height as of 11/15/24: 1.51 m (4' 11.45\").    Weight as of 11/15/24: 51.9 kg (114 lb 6.4 oz).  Physical Exam  GENERAL: alert and no distress  EYES: Eyes grossly normal to inspection, PERRL and conjunctivae and sclerae normal  RESP: lungs clear to auscultation - no rales, rhonchi or wheezes  CV: regular rate and rhythm, normal S1 S2, no S3 or S4, no murmur, click or rub, no peripheral edema  ABDOMEN: soft, nontender, no hepatosplenomegaly, no masses and bowel sounds normal  MS: no gross musculoskeletal defects noted, no edema  SKIN: no suspicious lesions or rashes  NEURO: Normal strength and tone, mentation intact and " speech normal  PSYCH: mentation appears normal, affect normal/bright    Recent Labs   Lab Test 01/09/25  0925 11/08/24  0859 07/02/24  0916   HGB  --  14.6  --    NA  --   --  141   POTASSIUM  --   --  4.8   CR 0.96*  --  0.92        Diagnostics  Labs pending at this time.  Results will be reviewed when available.   No EKG required for low risk surgery (cataract, skin procedure, breast biopsy, etc).    Revised Cardiac Risk Index (RCRI)  The patient has the following serious cardiovascular risks for perioperative complications:   - No serious cardiac risks = 0 points     RCRI Interpretation: 0 points: Class I (very low risk - 0.4% complication rate)         Signed Electronically by: RODRIGUEZ HARRISON MD  A copy of this evaluation report is provided to the requesting physician.         Her/She

## 2025-06-17 NOTE — PATIENT INSTRUCTIONS
How to Take Your Medication Before Surgery  Preoperative Medication Instructions   Antiplatelet or Anticoagulation Medication Instructions   - Bleeding risk is low for this procedure (e.g. dental, skin, cataract).    Additional Medication Instructions   - Herbal medications and vitamins: DO NOT TAKE 14 days prior to surgery.   - ACE/ARB/ARNI (lisinopril, enalapril, losartan, valsartan, olmesartan, sacubritril/valsartan) : Continue without modification (e.g., MAC anesthesia, neurosurgery, spine surgery, heart failure, or labile hypertension with risk of hypertension).       Patient Education   Preparing for Your Surgery  For Adults  Getting started  In most cases, a nurse will call to review your health history and instructions. They will give you an arrival time based on your scheduled surgery time. Please be ready to share:  Your doctor's clinic name and phone number  Your medical, surgical, and anesthesia history  A list of allergies and sensitivities  A list of medicines, including herbal treatments and over-the-counter drugs  Whether the patient has a legal guardian (ask how to send us the papers in advance)  Note: You may not receive a call if you were seen at our PAC (Preoperative Assessment Center).  Please tell us if you're pregnant--or if there's any chance you might be pregnant. Some surgeries may injure a fetus (unborn baby), so they require a pregnancy test. Surgeries that are safe for a fetus don't always need a test, and you can choose whether to have one.   Preparing for surgery  Within 10 to 30 days of surgery: Have a pre-op exam (sometimes called an H&P, or History and Physical). This can be done at a clinic or pre-operative center.  If you're having a , you may not need this exam. Talk to your care team.  At your pre-op exam, talk to your care team about all medicines you take. (This includes CBD oil and any drugs, such as THC, marijuana, and other forms of cannabis.) If you need to stop  any medicine before surgery, ask when to start taking it again.  This is for your safety. Many medicines and drugs can make you bleed too much during surgery. Some change how well surgery (anesthesia) drugs work.  Call your insurance company to let them know you're having surgery. (If you don't have insurance, call [phone].)  Call your clinic if there's any change in your health. This includes a scrape or scratch near the surgery site, or any signs of a cold (sore throat, runny nose, cough, rash, fever).  Eating and drinking guidelines  For your safety: Unless your surgeon tells you otherwise, follow the guidelines below.  Eat and drink as normal until 8 hours before you arrive for surgery. After that, no food or milk. You can spit out gum when you arrive.  Drink clear liquids until 2 hours before you arrive. These are liquids you can see through, like water, Gatorade, and Propel Water. They also include plain black coffee and tea (no cream or milk).  No alcohol for 24 hours before you arrive. The night before surgery, stop any drinks that contain THC.  If your care team tells you to take medicine on the morning of surgery, it's okay to take it with a sip of water. No other medicines or drugs are allowed (including CBD oil)--follow your care team's instructions.  If you have questions the day of surgery, call your hospital or surgery center.   Preventing infection  Shower or bathe the night before and the morning of surgery. Follow the instructions your clinic gave you. (If no instructions, use regular soap.)  Don't shave or clip hair near your surgery site. We'll remove the hair if needed.  Don't smoke or vape the morning of surgery. No chewing tobacco for 6 hours before you arrive. A nicotine patch is okay. You may spit out nicotine gum when you arrive.  For some surgeries, the surgeon will tell you to fully quit smoking and nicotine.  We will make every effort to keep you safe from infection. We will:  Clean  our hands often with soap and water (or an alcohol-based hand rub).  Clean the skin at your surgery site with a special soap that kills germs.  Give you a special gown to keep you warm. (Cold raises the risk of infection.)  Wear hair covers, masks, gowns, and gloves during surgery.  Give antibiotic medicine, if prescribed. Not all surgeries need this medicine.  What to bring on the day of surgery  Photo ID and insurance card  Copy of your health care directive, if you have one  Glasses and hearing aids (bring cases)  You can't wear contacts during surgery  Inhaler and eye drops, if you use them (tell us about these when you arrive)  CPAP machine or breathing device, if you use them  A few personal items, if spending the night  If you have . . .  A pacemaker, ICD (cardiac defibrillator), or other implant: Bring the ID card.  An implanted stimulator: Bring the remote control.  A legal guardian: Bring a copy of the certified (court-stamped) guardianship papers.  Please remove any jewelry, including body piercings. Leave jewelry and other valuables at home.  If you're going home the day of surgery  You must have a support person drive you home. They should stay with you overnight, and they may need to help with your self-care.  If you don't have a support person, please tells us as soon as possible. We can help.  After surgery  If it's hard to control your pain or you need more pain medicine, please call your surgeon's office.  Questions?   If you have any questions for your care team, list them here:   ____________________________________________________________________________________________________________________________________________________________________________________________________________________________________________________________  For informational purposes only. Not to replace the advice of your health care provider. Copyright   2003, 2019 [facility]. All rights reserved. Clinically  reviewed by Jason Little MD. [address] [id number] - REV 02/25.

## 2025-06-18 ENCOUNTER — RESULTS FOLLOW-UP (OUTPATIENT)
Dept: FAMILY MEDICINE | Facility: CLINIC | Age: 89
End: 2025-06-18

## 2025-06-18 LAB
ALBUMIN SERPL BCG-MCNC: 4.2 G/DL (ref 3.5–5.2)
ALP SERPL-CCNC: 89 U/L (ref 40–150)
ALT SERPL W P-5'-P-CCNC: 20 U/L (ref 0–50)
ANION GAP SERPL CALCULATED.3IONS-SCNC: 8 MMOL/L (ref 7–15)
AST SERPL W P-5'-P-CCNC: 28 U/L (ref 0–45)
BILIRUB SERPL-MCNC: 0.2 MG/DL
BUN SERPL-MCNC: 20.3 MG/DL (ref 8–23)
CALCIUM SERPL-MCNC: 9.6 MG/DL (ref 8.8–10.4)
CHLORIDE SERPL-SCNC: 104 MMOL/L (ref 98–107)
CREAT SERPL-MCNC: 0.86 MG/DL (ref 0.51–0.95)
EGFRCR SERPLBLD CKD-EPI 2021: 65 ML/MIN/1.73M2
GLUCOSE SERPL-MCNC: 95 MG/DL (ref 70–99)
HCO3 SERPL-SCNC: 29 MMOL/L (ref 22–29)
MAGNESIUM SERPL-MCNC: 2 MG/DL (ref 1.7–2.3)
PHOSPHATE SERPL-MCNC: 3.7 MG/DL (ref 2.5–4.5)
POTASSIUM SERPL-SCNC: 4.7 MMOL/L (ref 3.4–5.3)
PROT SERPL-MCNC: 6.9 G/DL (ref 6.4–8.3)
PTH-INTACT SERPL-MCNC: 50 PG/ML (ref 15–65)
SODIUM SERPL-SCNC: 141 MMOL/L (ref 135–145)
TSH SERPL DL<=0.005 MIU/L-ACNC: 1.64 UIU/ML (ref 0.3–4.2)
VIT D+METAB SERPL-MCNC: 72 NG/ML (ref 20–50)

## 2025-07-15 ENCOUNTER — ALLIED HEALTH/NURSE VISIT (OUTPATIENT)
Dept: FAMILY MEDICINE | Facility: CLINIC | Age: 89
End: 2025-07-15
Payer: COMMERCIAL

## 2025-07-15 DIAGNOSIS — M81.0 AGE-RELATED OSTEOPOROSIS WITHOUT CURRENT PATHOLOGICAL FRACTURE: Primary | ICD-10-CM

## 2025-07-15 PROCEDURE — 96372 THER/PROPH/DIAG INJ SC/IM: CPT | Performed by: STUDENT IN AN ORGANIZED HEALTH CARE EDUCATION/TRAINING PROGRAM

## 2025-07-15 PROCEDURE — 99207 PR NO CHARGE NURSE ONLY: CPT

## 2025-07-15 NOTE — PATIENT INSTRUCTIONS
You received your Prolia injection today  Your next Injection is due in 6 months (1/20/2026 10:00 AM with RN for Prolia )  If you plan on having any dental work done within the next 6 months, please let your dentist know that you are on this medication.  Make sure you do not have any dental work completed involving the jaw bone within 2 month prior to your scheduled injection

## 2025-07-15 NOTE — PROGRESS NOTES
Clinic Administered Medication Documentation      Prolia Documentation    Indication: Prolia  (denosumab) is a prescription medicine used to treat osteoporosis in patients who:   Are at high risk for fracture, meaning patients who have had a fracture related to osteoporosis, or who have multiple risk factors for fracture.  Cannot use another osteoporosis medicine or other osteoporosis medicines did not work well.  The timeline for early/late injections would be 4 weeks early and any time after the 6 month dottie. If a patient receives their injection late, then the subsequent injection would be 6 months from the date that they actually received the injection.    When was the last injection?  2025  Was the last injection at least 6 months ago? Yes  Has the prior authorization been completed?  Yes  Is there an active order (written within the past 365 days, with administrations remaining, not ) in the chart?  Yes   Calcium   Date Value Ref Range Status   2025 9.6 8.8 - 10.4 mg/dL Final   10/08/2020 9.2 8.5 - 10.1 mg/dL Final     Has patient had a Calcium test in the last 12 months? Yes   Is the calcium result 8.8 or above? Yes   GFR Estimate   Date Value Ref Range Status   2025 65 >60 mL/min/1.73m2 Final     Comment:     eGFR calculated using  CKD-EPI equation.   10/08/2020 58 (L) >60 mL/min/[1.73_m2] Final     Comment:     Non  GFR Calc  Starting 2018, serum creatinine based estimated GFR (eGFR) will be   calculated using the Chronic Kidney Disease Epidemiology Collaboration   (CKD-EPI) equation.       Has patient had a GFR within the last 12 months? Yes   Is GFR under 30, or patient has a diagnosis of CKD4 or CKD5? No   Patient denies gastric bypass or parathyroid surgery in past 6 months? Yes - patient denies.   Patient denies undergoing any dental procedures involving drilling into the bone, such as implants, extractions, or oral surgery, within the past two months  that have not yet healed?  Yes - patient denies  Patient denies plans for an emergency tooth extraction within the next week? Yes    The following steps were completed to comply with the REMS program for Prolia:  Reviewed information in the Medication Guide, including the serious risks of Prolia  and the symptoms of each risk.  Advised patient to seek prompt medical attention if they have signs or symptoms of any of the serious risks.  Provided each patient a copy of the Medication Guide and Patient Guide.    Prior to injection, verified patient identity using patient's name and date of birth. Medication was administered. Please see MAR and medication order for additional information. Patient instructed to remain in clinic for 15 minutes and report any adverse reaction to staff immediately.    Vial/Syringe: Syringe  Was this medication supplied by the patient? No  Verified that the patient has administrations remaining in their prescription.